# Patient Record
Sex: FEMALE | Race: WHITE | Employment: OTHER | URBAN - METROPOLITAN AREA
[De-identification: names, ages, dates, MRNs, and addresses within clinical notes are randomized per-mention and may not be internally consistent; named-entity substitution may affect disease eponyms.]

---

## 2017-01-07 ENCOUNTER — OFFICE VISIT (OUTPATIENT)
Dept: FAMILY MEDICINE CLINIC | Age: 67
End: 2017-01-07

## 2017-01-07 VITALS
HEIGHT: 68 IN | WEIGHT: 156 LBS | SYSTOLIC BLOOD PRESSURE: 114 MMHG | HEART RATE: 72 BPM | DIASTOLIC BLOOD PRESSURE: 72 MMHG | BODY MASS INDEX: 23.64 KG/M2 | RESPIRATION RATE: 16 BRPM | TEMPERATURE: 98.3 F

## 2017-01-07 DIAGNOSIS — G44.219 EPISODIC TENSION-TYPE HEADACHE, NOT INTRACTABLE: Primary | ICD-10-CM

## 2017-01-07 PROCEDURE — 99214 OFFICE O/P EST MOD 30 MIN: CPT | Performed by: FAMILY MEDICINE

## 2017-01-07 RX ORDER — CYCLOBENZAPRINE HCL 10 MG
10 TABLET ORAL NIGHTLY PRN
Qty: 10 TABLET | Refills: 3 | Status: SHIPPED | OUTPATIENT
Start: 2017-01-07 | End: 2018-01-07

## 2017-01-07 ASSESSMENT — PATIENT HEALTH QUESTIONNAIRE - PHQ9
SUM OF ALL RESPONSES TO PHQ9 QUESTIONS 1 & 2: 0
SUM OF ALL RESPONSES TO PHQ QUESTIONS 1-9: 0
2. FEELING DOWN, DEPRESSED OR HOPELESS: 0
1. LITTLE INTEREST OR PLEASURE IN DOING THINGS: 0

## 2017-01-09 PROBLEM — H40.033 NARROW ANGLE GLAUCOMA SUSPECT OF BOTH EYES: Status: ACTIVE | Noted: 2017-01-09

## 2017-03-10 ENCOUNTER — EMPLOYEE WELLNESS (OUTPATIENT)
Dept: OTHER | Age: 67
End: 2017-03-10

## 2017-08-31 ENCOUNTER — OFFICE VISIT (OUTPATIENT)
Dept: DERMATOLOGY | Age: 67
End: 2017-08-31

## 2017-08-31 DIAGNOSIS — L82.0 INFLAMED SEBORRHEIC KERATOSIS: ICD-10-CM

## 2017-08-31 DIAGNOSIS — Z85.828 HISTORY OF BASAL CELL CARCINOMA: ICD-10-CM

## 2017-08-31 DIAGNOSIS — Z80.8 FAMILY HISTORY OF MELANOMA: ICD-10-CM

## 2017-08-31 DIAGNOSIS — L82.1 SK (SEBORRHEIC KERATOSIS): Primary | ICD-10-CM

## 2017-08-31 PROCEDURE — 11306 SHAVE SKIN LESION 0.6-1.0 CM: CPT | Performed by: DERMATOLOGY

## 2017-08-31 PROCEDURE — 99213 OFFICE O/P EST LOW 20 MIN: CPT | Performed by: DERMATOLOGY

## 2017-09-05 ENCOUNTER — TELEPHONE (OUTPATIENT)
Dept: DERMATOLOGY | Age: 67
End: 2017-09-05

## 2017-10-09 ENCOUNTER — NURSE ONLY (OUTPATIENT)
Dept: FAMILY MEDICINE CLINIC | Age: 67
End: 2017-10-09

## 2017-10-09 DIAGNOSIS — Z23 NEEDS FLU SHOT: Primary | ICD-10-CM

## 2017-10-09 PROCEDURE — 90471 IMMUNIZATION ADMIN: CPT | Performed by: FAMILY MEDICINE

## 2017-10-09 PROCEDURE — 90630 INFLUENZA, QUADV, 18-64 YRS, ID, PF, MICRO INJ, 0.1ML (FLUZONE QUADV, PF): CPT | Performed by: FAMILY MEDICINE

## 2017-10-09 NOTE — PROGRESS NOTES
Vaccine Information Sheet, \"Influenza - Inactivated\"  given to Ge Jones, or parent/legal guardian of  Ge Jones and verbalized understanding. Patient responses:    Have you ever had a reaction to a flu vaccine? No  Are you able to eat eggs without adverse effects? No  Do you have any current illness? No  Have you ever had Guillian Delano Syndrome? No    Flu vaccine given per order. Please see immunization tab.

## 2017-10-12 ENCOUNTER — HOSPITAL ENCOUNTER (OUTPATIENT)
Dept: WOMENS IMAGING | Age: 67
Discharge: HOME | End: 2017-10-12
Attending: OBSTETRICS & GYNECOLOGY | Admitting: OBSTETRICS & GYNECOLOGY

## 2017-10-12 DIAGNOSIS — Z12.31 VISIT FOR SCREENING MAMMOGRAM: ICD-10-CM

## 2017-11-22 ENCOUNTER — HOSPITAL ENCOUNTER (OUTPATIENT)
Dept: GENERAL RADIOLOGY | Age: 67
Discharge: HOME | End: 2017-11-22
Attending: FAMILY MEDICINE | Admitting: FAMILY MEDICINE

## 2017-11-22 DIAGNOSIS — Z13.820 ENCOUNTER FOR SCREENING FOR OSTEOPOROSIS: ICD-10-CM

## 2017-11-22 DIAGNOSIS — Z13.820 SCREENING FOR OSTEOPOROSIS: ICD-10-CM

## 2018-03-20 VITALS — BODY MASS INDEX: 23.11 KG/M2 | WEIGHT: 152 LBS

## 2018-04-23 ENCOUNTER — OFFICE VISIT (OUTPATIENT)
Dept: ORTHOPEDIC SURGERY | Age: 68
End: 2018-04-23

## 2018-04-23 VITALS
HEART RATE: 62 BPM | BODY MASS INDEX: 24.17 KG/M2 | WEIGHT: 154 LBS | SYSTOLIC BLOOD PRESSURE: 99 MMHG | HEIGHT: 67 IN | DIASTOLIC BLOOD PRESSURE: 66 MMHG | RESPIRATION RATE: 16 BRPM

## 2018-04-23 DIAGNOSIS — G89.29 CHRONIC PAIN OF BOTH KNEES: ICD-10-CM

## 2018-04-23 DIAGNOSIS — M25.562 CHRONIC PAIN OF BOTH KNEES: ICD-10-CM

## 2018-04-23 DIAGNOSIS — M25.561 CHRONIC PAIN OF BOTH KNEES: ICD-10-CM

## 2018-04-23 DIAGNOSIS — M17.0 PRIMARY OSTEOARTHRITIS OF BOTH KNEES: Primary | ICD-10-CM

## 2018-04-23 PROCEDURE — G8427 DOCREV CUR MEDS BY ELIG CLIN: HCPCS | Performed by: ORTHOPAEDIC SURGERY

## 2018-04-23 PROCEDURE — 3017F COLORECTAL CA SCREEN DOC REV: CPT | Performed by: ORTHOPAEDIC SURGERY

## 2018-04-23 PROCEDURE — 1036F TOBACCO NON-USER: CPT | Performed by: ORTHOPAEDIC SURGERY

## 2018-04-23 PROCEDURE — 1090F PRES/ABSN URINE INCON ASSESS: CPT | Performed by: ORTHOPAEDIC SURGERY

## 2018-04-23 PROCEDURE — 20610 DRAIN/INJ JOINT/BURSA W/O US: CPT | Performed by: ORTHOPAEDIC SURGERY

## 2018-04-23 PROCEDURE — 1123F ACP DISCUSS/DSCN MKR DOCD: CPT | Performed by: ORTHOPAEDIC SURGERY

## 2018-04-23 PROCEDURE — G8399 PT W/DXA RESULTS DOCUMENT: HCPCS | Performed by: ORTHOPAEDIC SURGERY

## 2018-04-23 PROCEDURE — 4040F PNEUMOC VAC/ADMIN/RCVD: CPT | Performed by: ORTHOPAEDIC SURGERY

## 2018-04-23 PROCEDURE — 99203 OFFICE O/P NEW LOW 30 MIN: CPT | Performed by: ORTHOPAEDIC SURGERY

## 2018-04-23 PROCEDURE — G8420 CALC BMI NORM PARAMETERS: HCPCS | Performed by: ORTHOPAEDIC SURGERY

## 2018-09-04 ENCOUNTER — OFFICE VISIT (OUTPATIENT)
Dept: DERMATOLOGY | Age: 68
End: 2018-09-04

## 2018-09-04 DIAGNOSIS — L57.0 AK (ACTINIC KERATOSIS): ICD-10-CM

## 2018-09-04 DIAGNOSIS — D48.5 NEOPLASM OF UNCERTAIN BEHAVIOR OF SKIN: Primary | ICD-10-CM

## 2018-09-04 DIAGNOSIS — Z85.828 HISTORY OF BASAL CELL CARCINOMA: ICD-10-CM

## 2018-09-04 DIAGNOSIS — L82.1 SK (SEBORRHEIC KERATOSIS): ICD-10-CM

## 2018-09-04 DIAGNOSIS — Z80.8 FAMILY HISTORY OF MELANOMA: ICD-10-CM

## 2018-09-04 PROCEDURE — 11100 PR BIOPSY OF SKIN LESION: CPT | Performed by: DERMATOLOGY

## 2018-09-04 PROCEDURE — 1101F PT FALLS ASSESS-DOCD LE1/YR: CPT | Performed by: DERMATOLOGY

## 2018-09-04 PROCEDURE — G8399 PT W/DXA RESULTS DOCUMENT: HCPCS | Performed by: DERMATOLOGY

## 2018-09-04 PROCEDURE — G8427 DOCREV CUR MEDS BY ELIG CLIN: HCPCS | Performed by: DERMATOLOGY

## 2018-09-04 PROCEDURE — 99213 OFFICE O/P EST LOW 20 MIN: CPT | Performed by: DERMATOLOGY

## 2018-09-04 PROCEDURE — 1123F ACP DISCUSS/DSCN MKR DOCD: CPT | Performed by: DERMATOLOGY

## 2018-09-04 PROCEDURE — 17000 DESTRUCT PREMALG LESION: CPT | Performed by: DERMATOLOGY

## 2018-09-04 PROCEDURE — 3017F COLORECTAL CA SCREEN DOC REV: CPT | Performed by: DERMATOLOGY

## 2018-09-04 PROCEDURE — G8420 CALC BMI NORM PARAMETERS: HCPCS | Performed by: DERMATOLOGY

## 2018-09-04 PROCEDURE — 1036F TOBACCO NON-USER: CPT | Performed by: DERMATOLOGY

## 2018-09-04 PROCEDURE — 4040F PNEUMOC VAC/ADMIN/RCVD: CPT | Performed by: DERMATOLOGY

## 2018-09-04 PROCEDURE — 1090F PRES/ABSN URINE INCON ASSESS: CPT | Performed by: DERMATOLOGY

## 2018-09-04 NOTE — PROGRESS NOTES
[facility]at[facility]  Vannessa Pritchard MD  [phone]      Elijio Gowers  1950    74 y.o. female     Date of Visit: 9/4/2018    Chief Complaint: skin lesions    History of Present Illness:    1. She complains of an intermittently scabbed lesion on the side of the nose. 2.  She has numerous asymptomatic growths on the scalp and trunk. 3.  She has a hx of BCCs - denies any signs of recurrence. BCC of the nasal tip - treated with Mohs by [place], repair by [employer] in 2010. BCC on the forehead - treated with EDC in 1999.     Dysplastic nevus on the right upper back 2007.     4. She has a family history of melanomadad had melanoma on the back.       Review of Systems:  Skin: No new or changing moles. Past Medical History, Family History, Surgical History, Medications and Allergies reviewed. Past Medical History:   Diagnosis Date    Colon cancer ([address]     Narrow angle glaucoma suspect of both eyes 1/9/2017    Skin cancer     Thyroid disease      Past Surgical History:   Procedure Laterality Date    COLECTOMY  2011    cancerous polyp    COLONOSCOPY  09/16/2015    SKIN CANCER EXCISION      THYROID SURGERY  6/2011    BX        Allergies   Allergen Reactions    Adhesive Tape      bandaids    Erythromycin     Neosporin [Neomycin-Polymyxin-Gramicidin]      Any medication that ends in \"sporins\" - high sensitivity per PT      Outpatient Prescriptions Marked as Taking for the 9/4/18 encounter (Office Visit) with Jared Brewer MD   Medication Sig Dispense Refill    Multiple Vitamin (MULTIVITAMIN PO) Take  by mouth daily.  FIBER PO Take  by mouth daily.  Calcium Carbonate-Vit D-Min (CALCIUM 1200 PO) Take  by mouth daily. Social History:  Occupation:  Retired.        Physical Examination       The following were examined and determined to be normal: Psych/Neuro, Scalp/hair, Conjunctivae/eyelids, Gums/teeth/lips, Neck, Breast/axilla/chest, Abdomen, Back, RUE, LUE, RLE, LLE and Nails/digits. The following were examined and determined to be abnormal: Head/face. Well-appearing. 1.  Right nasal ala with a 6 mm telangiectatic pearly papule. 2.  Trunk axilla and scalp with multiple stuck on appearing waxy and verrucous brown papules and plaques. 3.  Clear. 4.  Upper lip at vermilion border - scaly pink macule. Assessment and Plan     1. Neoplasm of uncertain behavior of skin, right nasal ala - r/o BCC    Discussed possible diagnosis; patient agreeable to biopsy (verbal consent obtained). The area(s) to be biopsied were marked with a surgical pen. Alcohol was used to cleanse the site. Local anesthesia was acheived with 1% lidocaine with epinephrine. Shave biopsy was performed using a razor blade. Hemostasis was achieved with aluminum chloride. The wound(s) were dressed with petrolatum and covered with a bandage. Wound care instructions were reviewed. 1 Specimen (s) sent to pathology. The specimen bottles were appropriately labeled. We also reviewed the risks of bleeding, scar, and infection. 2. SK (seborrheic keratosis) - multiple    Reassurance. 3. History of basal cell carcinomas - clear    Sun protective behaviors and self skin examinations were encouraged. Call for any new or concerning lesions. 4.  AK of the upper lip -     2 cycles of liquid nitrogen applied to 1 AK on the upper lip. Patient was educated regarding the potential risks of blister formation, discomfort, hypopigmentation, and scar. Wound care was discussed. 5. Family history of melanoma    Sun protective behaviors and self skin examinations were encouraged. Call for any new or concerning lesions. Return in about 6 months (around 3/4/2019).

## 2018-09-06 DIAGNOSIS — C44.311 BASAL CELL CARCINOMA OF NOSE: Primary | ICD-10-CM

## 2018-09-06 LAB — DERMATOLOGY PATHOLOGY REPORT: ABNORMAL

## 2018-10-03 ENCOUNTER — PROCEDURE VISIT (OUTPATIENT)
Dept: SURGERY | Age: 68
End: 2018-10-03
Payer: MEDICARE

## 2018-10-03 VITALS
DIASTOLIC BLOOD PRESSURE: 63 MMHG | TEMPERATURE: 98.3 F | SYSTOLIC BLOOD PRESSURE: 123 MMHG | HEART RATE: 76 BPM | OXYGEN SATURATION: 98 % | WEIGHT: 153 LBS | BODY MASS INDEX: 24.01 KG/M2

## 2018-10-03 DIAGNOSIS — C44.311 BASAL CELL CARCINOMA OF RIGHT ALA NASI: Primary | ICD-10-CM

## 2018-10-03 PROBLEM — Z48.02 VISIT FOR SUTURE REMOVAL: Status: ACTIVE | Noted: 2018-10-03

## 2018-10-03 PROCEDURE — 17311 MOHS 1 STAGE H/N/HF/G: CPT | Performed by: DERMATOLOGY

## 2018-10-03 PROCEDURE — 14060 TIS TRNFR E/N/E/L 10 SQ CM/<: CPT | Performed by: DERMATOLOGY

## 2018-10-03 PROCEDURE — 17312 MOHS ADDL STAGE: CPT | Performed by: DERMATOLOGY

## 2018-10-03 NOTE — PROGRESS NOTES
PRE-PROCEDURE SCREENING    Pacemaker/ICD: No  Difficulty with numbing in the past: No  Local Anesthesia Reaction/passing out: Yes, can get light headed  Latex or adhesive allergy:  Yes, sensitive to bandaids  Bleeding/Clotting Disorders: No  Anticoagulant Therapy: No  Joint prosthesis: No  Artificial Heart Valve: No  Stroke or Seizures: No  Organ Transplant or Lymphoma: No  Immunosuppression: No  Respiratory Problems: No

## 2018-10-04 ENCOUNTER — TELEPHONE (OUTPATIENT)
Dept: SURGERY | Age: 68
End: 2018-10-04

## 2018-10-04 NOTE — TELEPHONE ENCOUNTER
The patient was in the office on 10/3/18 for Mohs surgery located on the Right nasal ala with Rhombic transposition flap repair. The patient tolerated the procedure well and left the office in good condition. A post-operative telephone call was placed at 12:11 in order to check on the patient's recovery process. The patient reported doing well and had no complaints. All of the patient's questions were answered.

## 2018-10-10 ENCOUNTER — OFFICE VISIT (OUTPATIENT)
Dept: SURGERY | Age: 68
End: 2018-10-10

## 2018-10-10 DIAGNOSIS — Z48.02 VISIT FOR SUTURE REMOVAL: Primary | ICD-10-CM

## 2018-10-10 PROCEDURE — 99024 POSTOP FOLLOW-UP VISIT: CPT | Performed by: DERMATOLOGY

## 2018-10-10 NOTE — PROGRESS NOTES
S:  The patient is here for suture removal s/p Mohs surgery on the right nasal ala and Rhombic Transposition Flap repair, one week ago. The site appears well-healed without signs of infection (redness, pain or discharge) flap looks great. The sutures were removed. Vaseline was applied. Wound care and activity instructions given. The patient was scheduled for follow-up in 2 months for scar/wound check. The patient was scheduled for f/u with General Dermatology per their instructions.

## 2018-10-29 ENCOUNTER — HOSPITAL ENCOUNTER (OUTPATIENT)
Dept: WOMENS IMAGING | Age: 68
Discharge: HOME | End: 2018-10-29
Payer: MEDICARE

## 2018-10-29 DIAGNOSIS — Z12.31 VISIT FOR SCREENING MAMMOGRAM: ICD-10-CM

## 2018-10-29 PROCEDURE — 77063 BREAST TOMOSYNTHESIS BI: CPT

## 2018-11-12 ENCOUNTER — HOSPITAL ENCOUNTER (OUTPATIENT)
Dept: ULTRASOUND IMAGING | Age: 68
Discharge: HOME | End: 2018-11-12
Payer: MEDICARE

## 2018-11-12 ENCOUNTER — HOSPITAL ENCOUNTER (OUTPATIENT)
Dept: WOMENS IMAGING | Age: 68
Discharge: HOME | End: 2018-11-12
Payer: MEDICARE

## 2018-11-12 DIAGNOSIS — R92.8 ABNORMAL MAMMOGRAM: ICD-10-CM

## 2018-11-12 PROCEDURE — G0279 TOMOSYNTHESIS, MAMMO: HCPCS

## 2018-11-12 PROCEDURE — 76642 ULTRASOUND BREAST LIMITED: CPT

## 2018-12-10 ENCOUNTER — OFFICE VISIT (OUTPATIENT)
Dept: SURGERY | Age: 68
End: 2018-12-10

## 2018-12-10 DIAGNOSIS — L90.5 SCAR: Primary | ICD-10-CM

## 2018-12-10 PROCEDURE — 99024 POSTOP FOLLOW-UP VISIT: CPT | Performed by: DERMATOLOGY

## 2018-12-20 ENCOUNTER — TELEPHONE (OUTPATIENT)
Dept: FAMILY MEDICINE CLINIC | Age: 68
End: 2018-12-20

## 2018-12-26 ENCOUNTER — OFFICE VISIT (OUTPATIENT)
Dept: FAMILY MEDICINE CLINIC | Age: 68
End: 2018-12-26
Payer: MEDICARE

## 2018-12-26 VITALS
BODY MASS INDEX: 24.17 KG/M2 | RESPIRATION RATE: 16 BRPM | TEMPERATURE: 98.2 F | WEIGHT: 154 LBS | HEIGHT: 67 IN | HEART RATE: 64 BPM | SYSTOLIC BLOOD PRESSURE: 112 MMHG | DIASTOLIC BLOOD PRESSURE: 62 MMHG

## 2018-12-26 DIAGNOSIS — Z23 NEED FOR PNEUMOCOCCAL VACCINATION: ICD-10-CM

## 2018-12-26 DIAGNOSIS — R93.89 ENDOMETRIAL THICKENING ON ULTRASOUND: ICD-10-CM

## 2018-12-26 DIAGNOSIS — Z85.038 HISTORY OF COLON CANCER: ICD-10-CM

## 2018-12-26 DIAGNOSIS — Z01.810 PRE-OPERATIVE CARDIOVASCULAR EXAMINATION: Primary | ICD-10-CM

## 2018-12-26 DIAGNOSIS — Z85.828 HISTORY OF BASAL CELL CARCINOMA: ICD-10-CM

## 2018-12-26 PROBLEM — Z48.02 VISIT FOR SUTURE REMOVAL: Status: RESOLVED | Noted: 2018-10-03 | Resolved: 2018-12-26

## 2018-12-26 PROCEDURE — G8420 CALC BMI NORM PARAMETERS: HCPCS | Performed by: FAMILY MEDICINE

## 2018-12-26 PROCEDURE — G8399 PT W/DXA RESULTS DOCUMENT: HCPCS | Performed by: FAMILY MEDICINE

## 2018-12-26 PROCEDURE — G8427 DOCREV CUR MEDS BY ELIG CLIN: HCPCS | Performed by: FAMILY MEDICINE

## 2018-12-26 PROCEDURE — 93000 ELECTROCARDIOGRAM COMPLETE: CPT | Performed by: FAMILY MEDICINE

## 2018-12-26 PROCEDURE — G0009 ADMIN PNEUMOCOCCAL VACCINE: HCPCS | Performed by: FAMILY MEDICINE

## 2018-12-26 PROCEDURE — G8482 FLU IMMUNIZE ORDER/ADMIN: HCPCS | Performed by: FAMILY MEDICINE

## 2018-12-26 PROCEDURE — 1036F TOBACCO NON-USER: CPT | Performed by: FAMILY MEDICINE

## 2018-12-26 PROCEDURE — 90670 PCV13 VACCINE IM: CPT | Performed by: FAMILY MEDICINE

## 2018-12-26 PROCEDURE — 1090F PRES/ABSN URINE INCON ASSESS: CPT | Performed by: FAMILY MEDICINE

## 2018-12-26 PROCEDURE — 1123F ACP DISCUSS/DSCN MKR DOCD: CPT | Performed by: FAMILY MEDICINE

## 2018-12-26 PROCEDURE — 99214 OFFICE O/P EST MOD 30 MIN: CPT | Performed by: FAMILY MEDICINE

## 2018-12-26 PROCEDURE — 4040F PNEUMOC VAC/ADMIN/RCVD: CPT | Performed by: FAMILY MEDICINE

## 2018-12-26 PROCEDURE — 3017F COLORECTAL CA SCREEN DOC REV: CPT | Performed by: FAMILY MEDICINE

## 2018-12-26 PROCEDURE — 1101F PT FALLS ASSESS-DOCD LE1/YR: CPT | Performed by: FAMILY MEDICINE

## 2018-12-26 ASSESSMENT — PATIENT HEALTH QUESTIONNAIRE - PHQ9
SUM OF ALL RESPONSES TO PHQ QUESTIONS 1-9: 0
SUM OF ALL RESPONSES TO PHQ QUESTIONS 1-9: 0
2. FEELING DOWN, DEPRESSED OR HOPELESS: 0
1. LITTLE INTEREST OR PLEASURE IN DOING THINGS: 0
SUM OF ALL RESPONSES TO PHQ9 QUESTIONS 1 & 2: 0

## 2018-12-26 NOTE — PROGRESS NOTES
in skin spots? No    Patient Active Problem List   Diagnosis    Multinodular goiter    Osteopenia    History of colon cancer- 2002, resected    Episodic tension-type headache, not intractable    Narrow angle glaucoma suspect of both eyes    Primary osteoarthritis of both knees    Scar    History of basal cell carcinoma     Past Medical History:   Diagnosis Date    Colon cancer ([address]     Narrow angle glaucoma suspect of both eyes 1/9/2017    Skin cancer     Thyroid disease      Past Surgical History:   Procedure Laterality Date    BREAST BIOPSY Left 2018    benign    COLECTOMY  2011    cancerous polyp    COLONOSCOPY  09/16/2015    MOHS SURGERY  10/03/2018    R ala BCC    SKIN CANCER EXCISION      THYROID SURGERY  6/2011    BX      Family History   Problem Relation Age of Onset    Cancer Father         ethel. melanoma     Heart Disease Maternal Grandfather     Osteoporosis Mother     Diabetes Neg Hx     High Cholesterol Neg Hx     Hypertension Neg Hx     Stroke Neg Hx     Thyroid Disease Neg Hx     Seizures Neg Hx      Social History     Social History    Marital status:      Spouse name: N/A    Number of children: N/A    Years of education: N/A     Social History Main Topics    Smoking status: Never Smoker    Smokeless tobacco: Never Used      Comment: advised not to start    Alcohol use None    Drug use: No    Sexual activity: Yes     Other Topics Concern    None     Social History Narrative    Exercise: walking daily. Lives with spouse. Seatbelt use: Always. Living will:  no,   additional information provided. Allergies   Allergen Reactions    Adhesive Tape      bandaids    Erythromycin     Neosporin [Neomycin-Polymyxin-Gramicidin]      Any medication that ends in \"sporins\" - high sensitivity per PT      Prior to Visit Medications    Medication Sig Taking? Authorizing Provider   Multiple Vitamin (MULTIVITAMIN PO) Take  by mouth daily.    Yes Historical

## 2019-01-03 ENCOUNTER — ANESTHESIA EVENT (OUTPATIENT)
Dept: OPERATING ROOM | Age: 69
End: 2019-01-03
Payer: MEDICARE

## 2019-01-04 RX ORDER — IBUPROFEN 200 MG
200 TABLET ORAL EVERY 6 HOURS PRN
COMMUNITY

## 2019-01-04 RX ORDER — MAGNESIUM 30 MG
250 TABLET ORAL DAILY
COMMUNITY

## 2019-01-08 ENCOUNTER — ANESTHESIA (OUTPATIENT)
Dept: OPERATING ROOM | Age: 69
End: 2019-01-08
Payer: MEDICARE

## 2019-01-08 ENCOUNTER — HOSPITAL ENCOUNTER (OUTPATIENT)
Age: 69
Setting detail: OUTPATIENT SURGERY
Discharge: HOME | End: 2019-01-08
Attending: OBSTETRICS & GYNECOLOGY | Admitting: OBSTETRICS & GYNECOLOGY
Payer: MEDICARE

## 2019-01-08 VITALS
HEIGHT: 67 IN | TEMPERATURE: 97 F | DIASTOLIC BLOOD PRESSURE: 61 MMHG | SYSTOLIC BLOOD PRESSURE: 106 MMHG | OXYGEN SATURATION: 98 % | RESPIRATION RATE: 14 BRPM | BODY MASS INDEX: 24.78 KG/M2 | WEIGHT: 157.85 LBS | HEART RATE: 52 BPM

## 2019-01-08 VITALS
SYSTOLIC BLOOD PRESSURE: 90 MMHG | RESPIRATION RATE: 6 BRPM | OXYGEN SATURATION: 99 % | DIASTOLIC BLOOD PRESSURE: 51 MMHG

## 2019-01-08 DIAGNOSIS — R93.89 THICKENED ENDOMETRIUM: ICD-10-CM

## 2019-01-08 DIAGNOSIS — R87.619 ATYPICAL GLANDULAR CELLS OF UNDETERMINED SIGNIFICANCE (AGUS) ON CERVICAL PAP SMEAR: ICD-10-CM

## 2019-01-08 LAB
ABO/RH: NORMAL
ANTIBODY SCREEN: NORMAL
HCT VFR BLD CALC: 43.9 % (ref 36–48)
HEMOGLOBIN: 14.5 G/DL (ref 12–16)
MCH RBC QN AUTO: 31.5 PG (ref 26–34)
MCHC RBC AUTO-ENTMCNC: 32.9 G/DL (ref 31–36)
MCV RBC AUTO: 95.6 FL (ref 80–100)
PDW BLD-RTO: 13 % (ref 12.4–15.4)
PLATELET # BLD: 209 K/UL (ref 135–450)
PMV BLD AUTO: 8.3 FL (ref 5–10.5)
RBC # BLD: 4.59 M/UL (ref 4–5.2)
WBC # BLD: 6.1 K/UL (ref 4–11)

## 2019-01-08 PROCEDURE — 86900 BLOOD TYPING SEROLOGIC ABO: CPT

## 2019-01-08 PROCEDURE — 7100000011 HC PHASE II RECOVERY - ADDTL 15 MIN: Performed by: OBSTETRICS & GYNECOLOGY

## 2019-01-08 PROCEDURE — 2580000003 HC RX 258: Performed by: ANESTHESIOLOGY

## 2019-01-08 PROCEDURE — 2500000003 HC RX 250 WO HCPCS: Performed by: NURSE ANESTHETIST, CERTIFIED REGISTERED

## 2019-01-08 PROCEDURE — 88305 TISSUE EXAM BY PATHOLOGIST: CPT

## 2019-01-08 PROCEDURE — 86901 BLOOD TYPING SEROLOGIC RH(D): CPT

## 2019-01-08 PROCEDURE — 7100000010 HC PHASE II RECOVERY - FIRST 15 MIN: Performed by: OBSTETRICS & GYNECOLOGY

## 2019-01-08 PROCEDURE — 3600000014 HC SURGERY LEVEL 4 ADDTL 15MIN: Performed by: OBSTETRICS & GYNECOLOGY

## 2019-01-08 PROCEDURE — 7100000001 HC PACU RECOVERY - ADDTL 15 MIN: Performed by: OBSTETRICS & GYNECOLOGY

## 2019-01-08 PROCEDURE — 7100000000 HC PACU RECOVERY - FIRST 15 MIN: Performed by: OBSTETRICS & GYNECOLOGY

## 2019-01-08 PROCEDURE — 2709999900 HC NON-CHARGEABLE SUPPLY: Performed by: OBSTETRICS & GYNECOLOGY

## 2019-01-08 PROCEDURE — 6360000002 HC RX W HCPCS: Performed by: NURSE ANESTHETIST, CERTIFIED REGISTERED

## 2019-01-08 PROCEDURE — 86850 RBC ANTIBODY SCREEN: CPT

## 2019-01-08 PROCEDURE — 3700000000 HC ANESTHESIA ATTENDED CARE: Performed by: OBSTETRICS & GYNECOLOGY

## 2019-01-08 PROCEDURE — 3700000001 HC ADD 15 MINUTES (ANESTHESIA): Performed by: OBSTETRICS & GYNECOLOGY

## 2019-01-08 PROCEDURE — 3600000004 HC SURGERY LEVEL 4 BASE: Performed by: OBSTETRICS & GYNECOLOGY

## 2019-01-08 PROCEDURE — 85027 COMPLETE CBC AUTOMATED: CPT

## 2019-01-08 PROCEDURE — 6360000002 HC RX W HCPCS: Performed by: ANESTHESIOLOGY

## 2019-01-08 RX ORDER — MIDAZOLAM HYDROCHLORIDE 1 MG/ML
INJECTION INTRAMUSCULAR; INTRAVENOUS PRN
Status: DISCONTINUED | OUTPATIENT
Start: 2019-01-08 | End: 2019-01-08 | Stop reason: SDUPTHER

## 2019-01-08 RX ORDER — SODIUM CHLORIDE 9 MG/ML
INJECTION, SOLUTION INTRAVENOUS CONTINUOUS
Status: DISCONTINUED | OUTPATIENT
Start: 2019-01-08 | End: 2019-01-08 | Stop reason: HOSPADM

## 2019-01-08 RX ORDER — FENTANYL CITRATE 50 UG/ML
50 INJECTION, SOLUTION INTRAMUSCULAR; INTRAVENOUS EVERY 5 MIN PRN
Status: DISCONTINUED | OUTPATIENT
Start: 2019-01-08 | End: 2019-01-08 | Stop reason: HOSPADM

## 2019-01-08 RX ORDER — ONDANSETRON 2 MG/ML
4 INJECTION INTRAMUSCULAR; INTRAVENOUS
Status: COMPLETED | OUTPATIENT
Start: 2019-01-08 | End: 2019-01-08

## 2019-01-08 RX ORDER — SODIUM CHLORIDE 0.9 % (FLUSH) 0.9 %
10 SYRINGE (ML) INJECTION PRN
Status: DISCONTINUED | OUTPATIENT
Start: 2019-01-08 | End: 2019-01-08 | Stop reason: HOSPADM

## 2019-01-08 RX ORDER — MEPERIDINE HYDROCHLORIDE 25 MG/ML
12.5 INJECTION INTRAMUSCULAR; INTRAVENOUS; SUBCUTANEOUS EVERY 5 MIN PRN
Status: DISCONTINUED | OUTPATIENT
Start: 2019-01-08 | End: 2019-01-08 | Stop reason: HOSPADM

## 2019-01-08 RX ORDER — OXYCODONE HYDROCHLORIDE 5 MG/1
5 TABLET ORAL PRN
Status: DISCONTINUED | OUTPATIENT
Start: 2019-01-08 | End: 2019-01-08 | Stop reason: HOSPADM

## 2019-01-08 RX ORDER — MORPHINE SULFATE 2 MG/ML
1 INJECTION, SOLUTION INTRAMUSCULAR; INTRAVENOUS EVERY 5 MIN PRN
Status: DISCONTINUED | OUTPATIENT
Start: 2019-01-08 | End: 2019-01-08 | Stop reason: HOSPADM

## 2019-01-08 RX ORDER — FENTANYL CITRATE 50 UG/ML
25 INJECTION, SOLUTION INTRAMUSCULAR; INTRAVENOUS EVERY 5 MIN PRN
Status: DISCONTINUED | OUTPATIENT
Start: 2019-01-08 | End: 2019-01-08 | Stop reason: HOSPADM

## 2019-01-08 RX ORDER — SODIUM CHLORIDE 0.9 % (FLUSH) 0.9 %
10 SYRINGE (ML) INJECTION EVERY 12 HOURS SCHEDULED
Status: DISCONTINUED | OUTPATIENT
Start: 2019-01-08 | End: 2019-01-08 | Stop reason: HOSPADM

## 2019-01-08 RX ORDER — DEXAMETHASONE SODIUM PHOSPHATE 4 MG/ML
INJECTION, SOLUTION INTRA-ARTICULAR; INTRALESIONAL; INTRAMUSCULAR; INTRAVENOUS; SOFT TISSUE PRN
Status: DISCONTINUED | OUTPATIENT
Start: 2019-01-08 | End: 2019-01-08 | Stop reason: SDUPTHER

## 2019-01-08 RX ORDER — LIDOCAINE HYDROCHLORIDE 20 MG/ML
INJECTION, SOLUTION EPIDURAL; INFILTRATION; INTRACAUDAL; PERINEURAL PRN
Status: DISCONTINUED | OUTPATIENT
Start: 2019-01-08 | End: 2019-01-08 | Stop reason: SDUPTHER

## 2019-01-08 RX ORDER — PROPOFOL 10 MG/ML
INJECTION, EMULSION INTRAVENOUS PRN
Status: DISCONTINUED | OUTPATIENT
Start: 2019-01-08 | End: 2019-01-08 | Stop reason: SDUPTHER

## 2019-01-08 RX ORDER — FENTANYL CITRATE 50 UG/ML
INJECTION, SOLUTION INTRAMUSCULAR; INTRAVENOUS PRN
Status: DISCONTINUED | OUTPATIENT
Start: 2019-01-08 | End: 2019-01-08 | Stop reason: SDUPTHER

## 2019-01-08 RX ORDER — OXYCODONE HYDROCHLORIDE 5 MG/1
10 TABLET ORAL PRN
Status: DISCONTINUED | OUTPATIENT
Start: 2019-01-08 | End: 2019-01-08 | Stop reason: HOSPADM

## 2019-01-08 RX ORDER — MORPHINE SULFATE 2 MG/ML
2 INJECTION, SOLUTION INTRAMUSCULAR; INTRAVENOUS EVERY 5 MIN PRN
Status: DISCONTINUED | OUTPATIENT
Start: 2019-01-08 | End: 2019-01-08 | Stop reason: HOSPADM

## 2019-01-08 RX ADMIN — LIDOCAINE HYDROCHLORIDE 40 MG: 20 INJECTION, SOLUTION EPIDURAL; INFILTRATION; INTRACAUDAL; PERINEURAL at 08:40

## 2019-01-08 RX ADMIN — FENTANYL CITRATE 100 MCG: 50 INJECTION INTRAMUSCULAR; INTRAVENOUS at 08:35

## 2019-01-08 RX ADMIN — DEXAMETHASONE SODIUM PHOSPHATE 8 MG: 4 INJECTION, SOLUTION INTRAMUSCULAR; INTRAVENOUS at 08:56

## 2019-01-08 RX ADMIN — ONDANSETRON 4 MG: 2 INJECTION INTRAMUSCULAR; INTRAVENOUS at 08:56

## 2019-01-08 RX ADMIN — SODIUM CHLORIDE: 9 INJECTION, SOLUTION INTRAVENOUS at 07:43

## 2019-01-08 RX ADMIN — MIDAZOLAM 2 MG: 1 INJECTION INTRAMUSCULAR; INTRAVENOUS at 08:35

## 2019-01-08 RX ADMIN — PROPOFOL 150 MG: 10 INJECTION, EMULSION INTRAVENOUS at 08:40

## 2019-01-08 ASSESSMENT — PULMONARY FUNCTION TESTS
PIF_VALUE: 9
PIF_VALUE: 13
PIF_VALUE: 1
PIF_VALUE: 3
PIF_VALUE: 17
PIF_VALUE: 2
PIF_VALUE: 13
PIF_VALUE: 14
PIF_VALUE: 3
PIF_VALUE: 17
PIF_VALUE: 1
PIF_VALUE: 2
PIF_VALUE: 3
PIF_VALUE: 0
PIF_VALUE: 1
PIF_VALUE: 15
PIF_VALUE: 14
PIF_VALUE: 11
PIF_VALUE: 2
PIF_VALUE: 3
PIF_VALUE: 14
PIF_VALUE: 9
PIF_VALUE: 3
PIF_VALUE: 15
PIF_VALUE: 1

## 2019-01-08 ASSESSMENT — PAIN SCALES - GENERAL
PAINLEVEL_OUTOF10: 0

## 2019-01-08 ASSESSMENT — PAIN - FUNCTIONAL ASSESSMENT: PAIN_FUNCTIONAL_ASSESSMENT: 0-10

## 2019-03-04 ENCOUNTER — OFFICE VISIT (OUTPATIENT)
Dept: DERMATOLOGY | Age: 69
End: 2019-03-04
Payer: MEDICARE

## 2019-03-04 DIAGNOSIS — L82.1 SEBORRHEIC KERATOSIS: Primary | ICD-10-CM

## 2019-03-04 DIAGNOSIS — Z85.828 HISTORY OF BASAL CELL CARCINOMA: ICD-10-CM

## 2019-03-04 DIAGNOSIS — Z80.8 FAMILY HISTORY OF MELANOMA: ICD-10-CM

## 2019-03-04 PROCEDURE — 3017F COLORECTAL CA SCREEN DOC REV: CPT | Performed by: DERMATOLOGY

## 2019-03-04 PROCEDURE — 99213 OFFICE O/P EST LOW 20 MIN: CPT | Performed by: DERMATOLOGY

## 2019-03-04 PROCEDURE — G8399 PT W/DXA RESULTS DOCUMENT: HCPCS | Performed by: DERMATOLOGY

## 2019-03-04 PROCEDURE — 4040F PNEUMOC VAC/ADMIN/RCVD: CPT | Performed by: DERMATOLOGY

## 2019-03-04 PROCEDURE — G8420 CALC BMI NORM PARAMETERS: HCPCS | Performed by: DERMATOLOGY

## 2019-03-04 PROCEDURE — G8427 DOCREV CUR MEDS BY ELIG CLIN: HCPCS | Performed by: DERMATOLOGY

## 2019-03-04 PROCEDURE — 1090F PRES/ABSN URINE INCON ASSESS: CPT | Performed by: DERMATOLOGY

## 2019-03-04 PROCEDURE — 1101F PT FALLS ASSESS-DOCD LE1/YR: CPT | Performed by: DERMATOLOGY

## 2019-03-04 PROCEDURE — G8482 FLU IMMUNIZE ORDER/ADMIN: HCPCS | Performed by: DERMATOLOGY

## 2019-03-04 PROCEDURE — 1123F ACP DISCUSS/DSCN MKR DOCD: CPT | Performed by: DERMATOLOGY

## 2019-03-04 PROCEDURE — 1036F TOBACCO NON-USER: CPT | Performed by: DERMATOLOGY

## 2019-03-25 ENCOUNTER — OFFICE VISIT (OUTPATIENT)
Dept: FAMILY MEDICINE CLINIC | Age: 69
End: 2019-03-25
Payer: MEDICARE

## 2019-03-25 VITALS
WEIGHT: 153 LBS | HEIGHT: 67 IN | TEMPERATURE: 97.8 F | DIASTOLIC BLOOD PRESSURE: 68 MMHG | BODY MASS INDEX: 24.01 KG/M2 | RESPIRATION RATE: 18 BRPM | SYSTOLIC BLOOD PRESSURE: 106 MMHG | HEART RATE: 62 BPM

## 2019-03-25 DIAGNOSIS — E80.6 HYPERBILIRUBINEMIA: ICD-10-CM

## 2019-03-25 DIAGNOSIS — M85.89 OSTEOPENIA OF MULTIPLE SITES: ICD-10-CM

## 2019-03-25 DIAGNOSIS — E04.2 MULTINODULAR GOITER: ICD-10-CM

## 2019-03-25 DIAGNOSIS — M17.0 PRIMARY OSTEOARTHRITIS OF BOTH KNEES: ICD-10-CM

## 2019-03-25 DIAGNOSIS — Z85.038 HISTORY OF COLON CANCER: ICD-10-CM

## 2019-03-25 DIAGNOSIS — Z00.00 INITIAL MEDICARE ANNUAL WELLNESS VISIT: ICD-10-CM

## 2019-03-25 DIAGNOSIS — Z11.59 NEED FOR HEPATITIS C SCREENING TEST: ICD-10-CM

## 2019-03-25 DIAGNOSIS — Z85.828 HISTORY OF BASAL CELL CARCINOMA: ICD-10-CM

## 2019-03-25 DIAGNOSIS — H40.033 NARROW ANGLE GLAUCOMA SUSPECT OF BOTH EYES: ICD-10-CM

## 2019-03-25 DIAGNOSIS — Z00.00 INITIAL MEDICARE ANNUAL WELLNESS VISIT: Primary | ICD-10-CM

## 2019-03-25 PROBLEM — L90.5 SCAR: Status: RESOLVED | Noted: 2018-12-10 | Resolved: 2019-03-25

## 2019-03-25 LAB
CHOLESTEROL, TOTAL: 157 MG/DL (ref 0–199)
HDLC SERPL-MCNC: 62 MG/DL (ref 40–60)
HEPATITIS C ANTIBODY INTERPRETATION: NORMAL
LDL CHOLESTEROL CALCULATED: 85 MG/DL
TRIGL SERPL-MCNC: 50 MG/DL (ref 0–150)
TSH SERPL DL<=0.05 MIU/L-ACNC: 0.64 UIU/ML (ref 0.27–4.2)
VLDLC SERPL CALC-MCNC: 10 MG/DL

## 2019-03-25 PROCEDURE — G0438 PPPS, INITIAL VISIT: HCPCS | Performed by: FAMILY MEDICINE

## 2019-03-25 PROCEDURE — G8482 FLU IMMUNIZE ORDER/ADMIN: HCPCS | Performed by: FAMILY MEDICINE

## 2019-03-25 PROCEDURE — 4040F PNEUMOC VAC/ADMIN/RCVD: CPT | Performed by: FAMILY MEDICINE

## 2019-03-25 ASSESSMENT — PATIENT HEALTH QUESTIONNAIRE - PHQ9
SUM OF ALL RESPONSES TO PHQ QUESTIONS 1-9: 0
SUM OF ALL RESPONSES TO PHQ QUESTIONS 1-9: 0
1. LITTLE INTEREST OR PLEASURE IN DOING THINGS: 0
2. FEELING DOWN, DEPRESSED OR HOPELESS: 0
SUM OF ALL RESPONSES TO PHQ9 QUESTIONS 1 & 2: 0

## 2019-03-26 ENCOUNTER — TELEPHONE (OUTPATIENT)
Dept: FAMILY MEDICINE CLINIC | Age: 69
End: 2019-03-26

## 2019-03-26 LAB
A/G RATIO: 1.8 (ref 1.1–2.2)
ALBUMIN SERPL-MCNC: 4.3 G/DL (ref 3.4–5)
ALP BLD-CCNC: 63 U/L (ref 40–129)
ALT SERPL-CCNC: 15 U/L (ref 10–40)
ANION GAP SERPL CALCULATED.3IONS-SCNC: 10 MMOL/L (ref 3–16)
AST SERPL-CCNC: 20 U/L (ref 15–37)
BILIRUB SERPL-MCNC: 1.8 MG/DL (ref 0–1)
BUN BLDV-MCNC: 12 MG/DL (ref 7–20)
CALCIUM SERPL-MCNC: 9.3 MG/DL (ref 8.3–10.6)
CHLORIDE BLD-SCNC: 105 MMOL/L (ref 99–110)
CO2: 27 MMOL/L (ref 21–32)
CREAT SERPL-MCNC: 0.7 MG/DL (ref 0.6–1.2)
GFR AFRICAN AMERICAN: >60
GFR NON-AFRICAN AMERICAN: >60
GLOBULIN: 2.4 G/DL
GLUCOSE BLD-MCNC: 85 MG/DL (ref 70–99)
POTASSIUM SERPL-SCNC: 4.5 MMOL/L (ref 3.5–5.1)
SODIUM BLD-SCNC: 142 MMOL/L (ref 136–145)
TOTAL PROTEIN: 6.7 G/DL (ref 6.4–8.2)

## 2019-04-10 ENCOUNTER — OFFICE VISIT (OUTPATIENT)
Dept: ORTHOPEDIC SURGERY | Age: 69
End: 2019-04-10
Payer: MEDICARE

## 2019-04-10 VITALS
BODY MASS INDEX: 24.01 KG/M2 | WEIGHT: 153 LBS | DIASTOLIC BLOOD PRESSURE: 62 MMHG | HEART RATE: 71 BPM | SYSTOLIC BLOOD PRESSURE: 99 MMHG | HEIGHT: 67 IN

## 2019-04-10 DIAGNOSIS — M17.0 PRIMARY OSTEOARTHRITIS OF BOTH KNEES: Primary | ICD-10-CM

## 2019-04-10 PROCEDURE — 99213 OFFICE O/P EST LOW 20 MIN: CPT | Performed by: ORTHOPAEDIC SURGERY

## 2019-04-10 PROCEDURE — 20610 DRAIN/INJ JOINT/BURSA W/O US: CPT | Performed by: ORTHOPAEDIC SURGERY

## 2019-04-10 RX ORDER — TRIAMCINOLONE ACETONIDE 40 MG/ML
40 INJECTION, SUSPENSION INTRA-ARTICULAR; INTRAMUSCULAR ONCE
Status: COMPLETED | OUTPATIENT
Start: 2019-04-10 | End: 2019-04-10

## 2019-04-10 RX ADMIN — TRIAMCINOLONE ACETONIDE 40 MG: 40 INJECTION, SUSPENSION INTRA-ARTICULAR; INTRAMUSCULAR at 13:50

## 2019-04-10 RX ADMIN — TRIAMCINOLONE ACETONIDE 40 MG: 40 INJECTION, SUSPENSION INTRA-ARTICULAR; INTRAMUSCULAR at 13:49

## 2019-04-10 NOTE — PROGRESS NOTES
CHIEF COMPLAINT: Bilateral knee pain. History:   Lizbeth Aguirre is a 79 y.o. White female here for bilateral knee follow up. Prior injections on 4/23/18 lasted almost 1 year. She is requesting repeat injections because she is going on a trip to [place]. She rates her pain 5/10. Physical Examination:     Vital signs:   BP 99/62   Pulse 71   Ht 5' 6.5\" (1.689 m)   Wt 153 lb (69.4 kg)   BMI 24.32 kg/m²      General:  alert, appears stated age, cooperative and no distress       Imaging:  Bilateral Knee X-Ray 4/23/18: Tibiofemoral joint space maintainted, minimal marginal osteophytes. Severe end-stage patellofemoral arthritis on right. Severe patellofemoral arthritis on left. Assessment:     Bilateral knee patellofemoral arthritis      Plan:     The risks and benefits of an injection were discussed with the patient. The patient had full opportunity to ask questions and all were answered. The patient then provided verbal informed consent. The skin was then prepped with betadine solution and alcohol. Under aseptic conditions, the  bilateral knees were injected with 4cc of 1% xylocaine, 4cc of 0.25% marcaine, and 1cc of Kenalog (40mg/ml). There were no immediate complications following the injection. The patient was advised of the possibility of injection site reaction and instructed to apply ice to the area and take NSAIDs if able. NSAIDs prn. Ice prn. Handout with patellofemoral HEP provided. Follow up 3 months prn injection.

## 2019-04-17 PROBLEM — R87.619 ATYPICAL GLANDULAR CELLS OF UNDETERMINED SIGNIFICANCE (AGUS) ON CERVICAL PAP SMEAR: Status: ACTIVE | Noted: 2019-04-17

## 2019-09-04 ENCOUNTER — OFFICE VISIT (OUTPATIENT)
Dept: DERMATOLOGY | Age: 69
End: 2019-09-04
Payer: MEDICARE

## 2019-09-04 DIAGNOSIS — L57.0 ACTINIC KERATOSIS: Primary | ICD-10-CM

## 2019-09-04 DIAGNOSIS — Z85.828 HISTORY OF BASAL CELL CARCINOMA (BCC): ICD-10-CM

## 2019-09-04 DIAGNOSIS — L82.1 SK (SEBORRHEIC KERATOSIS): ICD-10-CM

## 2019-09-04 PROCEDURE — G8420 CALC BMI NORM PARAMETERS: HCPCS | Performed by: DERMATOLOGY

## 2019-09-04 PROCEDURE — G8399 PT W/DXA RESULTS DOCUMENT: HCPCS | Performed by: DERMATOLOGY

## 2019-09-04 PROCEDURE — 99213 OFFICE O/P EST LOW 20 MIN: CPT | Performed by: DERMATOLOGY

## 2019-09-04 PROCEDURE — 1036F TOBACCO NON-USER: CPT | Performed by: DERMATOLOGY

## 2019-09-04 PROCEDURE — 3017F COLORECTAL CA SCREEN DOC REV: CPT | Performed by: DERMATOLOGY

## 2019-09-04 PROCEDURE — 4040F PNEUMOC VAC/ADMIN/RCVD: CPT | Performed by: DERMATOLOGY

## 2019-09-04 PROCEDURE — 1123F ACP DISCUSS/DSCN MKR DOCD: CPT | Performed by: DERMATOLOGY

## 2019-09-04 PROCEDURE — 1090F PRES/ABSN URINE INCON ASSESS: CPT | Performed by: DERMATOLOGY

## 2019-09-04 PROCEDURE — G8427 DOCREV CUR MEDS BY ELIG CLIN: HCPCS | Performed by: DERMATOLOGY

## 2019-09-04 NOTE — PROGRESS NOTES
[facility] Dermatology  Erich Hamlin MD  [phone]      Beto Miramontes  1950    79 y.o. female     Date of Visit: 9/4/2019    Chief Complaint: skin lesions    History of Present Illness:    1. She complains of an asymptomatic skin lesion on the R side of the nose. 2.  She reports multiple growths on the scalp and back. None are symptomatic. 3.  She has a history of multiple BCCs-denies any signs of recurrence. Nodular BCC of the right nasal ala-treated with Mohs by Dr. Wright Daily on 10/3/2018. BCC of the nasal tip - treated with Mohs by [place], repair by [place] in 2010. BCC on the forehead - treated with EDC in 1999.     Dysplastic nevus on the right upper back 2007. Review of Systems:  Skin: No new or changing moles. Past Medical History, Family History, Surgical History, Medications and Allergies reviewed.     Past Medical History:   Diagnosis Date    Arthritis     Colon cancer ([address]     Narrow angle glaucoma suspect of both eyes 1/9/2017    PONV (postoperative nausea and vomiting)     Skin cancer     Thyroid disease      Past Surgical History:   Procedure Laterality Date    BREAST BIOPSY Left 2018    benign    COLECTOMY  2011    cancerous polyp    COLONOSCOPY  09/16/2015    DILATION AND CURETTAGE OF UTERUS N/A 1/8/2019    benign- HYSTEROSCOPY DILATATION AND CURETTAGE performed by Christa De La O MD at [place] [address]  10/03/2018    R ala BCC    SKIN CANCER EXCISION      THYROID SURGERY  6/2011    BX        Allergies   Allergen Reactions    Erythromycin Shortness Of Breath    Adhesive Tape Rash     bandaids    Neosporin [Neomycin-Polymyxin-Gramicidin] Itching and Swelling     Any medication that ends in \"sporins\" - high sensitivity per PT      Outpatient Medications Marked as Taking for the 9/4/19 encounter (Office Visit) with Javier Tillman MD   Medication Sig Dispense Refill    magnesium 30 MG tablet Take 250 mg by mouth daily

## 2019-11-21 ENCOUNTER — HOSPITAL ENCOUNTER (OUTPATIENT)
Dept: WOMENS IMAGING | Age: 69
Discharge: HOME | End: 2019-11-21
Payer: MEDICARE

## 2019-11-21 DIAGNOSIS — Z12.31 VISIT FOR SCREENING MAMMOGRAM: ICD-10-CM

## 2019-11-21 PROCEDURE — 77063 BREAST TOMOSYNTHESIS BI: CPT

## 2020-03-04 ENCOUNTER — OFFICE VISIT (OUTPATIENT)
Dept: DERMATOLOGY | Age: 70
End: 2020-03-04
Payer: MEDICARE

## 2020-03-04 PROCEDURE — 99213 OFFICE O/P EST LOW 20 MIN: CPT | Performed by: DERMATOLOGY

## 2020-03-04 PROCEDURE — G8399 PT W/DXA RESULTS DOCUMENT: HCPCS | Performed by: DERMATOLOGY

## 2020-03-04 PROCEDURE — 1090F PRES/ABSN URINE INCON ASSESS: CPT | Performed by: DERMATOLOGY

## 2020-03-04 PROCEDURE — G8427 DOCREV CUR MEDS BY ELIG CLIN: HCPCS | Performed by: DERMATOLOGY

## 2020-03-04 PROCEDURE — 1123F ACP DISCUSS/DSCN MKR DOCD: CPT | Performed by: DERMATOLOGY

## 2020-03-04 PROCEDURE — 11102 TANGNTL BX SKIN SINGLE LES: CPT | Performed by: DERMATOLOGY

## 2020-03-04 PROCEDURE — 3017F COLORECTAL CA SCREEN DOC REV: CPT | Performed by: DERMATOLOGY

## 2020-03-04 PROCEDURE — G8420 CALC BMI NORM PARAMETERS: HCPCS | Performed by: DERMATOLOGY

## 2020-03-04 PROCEDURE — 1036F TOBACCO NON-USER: CPT | Performed by: DERMATOLOGY

## 2020-03-04 PROCEDURE — 4040F PNEUMOC VAC/ADMIN/RCVD: CPT | Performed by: DERMATOLOGY

## 2020-03-04 PROCEDURE — G8484 FLU IMMUNIZE NO ADMIN: HCPCS | Performed by: DERMATOLOGY

## 2020-03-04 NOTE — PROGRESS NOTES
[facility]  Cody Casas MD  [phone]      Patti Samples  1950    81 y.o. female     Date of Visit: 3/4/2020    Chief Complaint: skin lesions    History of Present Illness:    1. She reports numerous asymptomatic lesions on the trunk. 2.  Unknown duration of a persistent pink lesion on the right lower cheek. 3.  Has history of BCCs - denies any signs of recurrence. Nodular BCC of the right nasal ala-treated with Mohs by Dr. Mary Olvera on 10/3/2018. BCC of the nasal tip - treated with Mohs by [place], repair by [employer] in 2010. BCC on the forehead - treated with EDC in 1999.     Dysplastic nevus on the right upper back 2007. Review of Systems:  Skin: No new or changing moles. Past Medical History, Family History, Surgical History, Medications and Allergies reviewed.     Past Medical History:   Diagnosis Date    Arthritis     Colon cancer ([address]     Narrow angle glaucoma suspect of both eyes 1/9/2017    PONV (postoperative nausea and vomiting)     Skin cancer     Thyroid disease      Past Surgical History:   Procedure Laterality Date    BREAST BIOPSY Left 2018    benign    COLECTOMY  2011    cancerous polyp    COLONOSCOPY  09/16/2015    DILATION AND CURETTAGE OF UTERUS N/A 1/8/2019    benign- HYSTEROSCOPY DILATATION AND CURETTAGE performed by Marella Duverney, MD at [place] [address]  10/03/2018    R ala BCC    SKIN CANCER EXCISION      THYROID SURGERY  6/2011    BX        Allergies   Allergen Reactions    Erythromycin Shortness Of Breath    Adhesive Tape Rash     bandaids    Neosporin [Neomycin-Polymyxin-Gramicidin] Itching and Swelling     Any medication that ends in \"sporins\" - high sensitivity per PT      Outpatient Medications Marked as Taking for the 3/4/20 encounter (Office Visit) with George Adkins MD   Medication Sig Dispense Refill    ibuprofen (ADVIL;MOTRIN) 200 MG tablet Take 200 mg by mouth every 6 hours as needed

## 2020-03-04 NOTE — PATIENT INSTRUCTIONS

## 2020-03-06 LAB — DERMATOLOGY PATHOLOGY REPORT: NORMAL

## 2020-08-07 ENCOUNTER — TELEMEDICINE (OUTPATIENT)
Dept: FAMILY MEDICINE CLINIC | Age: 70
End: 2020-08-07
Payer: MEDICARE

## 2020-08-07 VITALS — BODY MASS INDEX: 24.01 KG/M2 | WEIGHT: 151 LBS

## 2020-08-07 PROCEDURE — G0439 PPPS, SUBSEQ VISIT: HCPCS | Performed by: FAMILY MEDICINE

## 2020-08-07 PROCEDURE — 3017F COLORECTAL CA SCREEN DOC REV: CPT | Performed by: FAMILY MEDICINE

## 2020-08-07 PROCEDURE — 4040F PNEUMOC VAC/ADMIN/RCVD: CPT | Performed by: FAMILY MEDICINE

## 2020-08-07 PROCEDURE — 1123F ACP DISCUSS/DSCN MKR DOCD: CPT | Performed by: FAMILY MEDICINE

## 2020-08-07 ASSESSMENT — LIFESTYLE VARIABLES
HOW OFTEN DURING THE LAST YEAR HAVE YOU FOUND THAT YOU WERE NOT ABLE TO STOP DRINKING ONCE YOU HAD STARTED: 0
HOW OFTEN DURING THE LAST YEAR HAVE YOU HAD A FEELING OF GUILT OR REMORSE AFTER DRINKING: 0
HOW OFTEN DURING THE LAST YEAR HAVE YOU NEEDED AN ALCOHOLIC DRINK FIRST THING IN THE MORNING TO GET YOURSELF GOING AFTER A NIGHT OF HEAVY DRINKING: 0
HOW OFTEN DURING THE LAST YEAR HAVE YOU BEEN UNABLE TO REMEMBER WHAT HAPPENED THE NIGHT BEFORE BECAUSE YOU HAD BEEN DRINKING: 0
AUDIT TOTAL SCORE: 2
HAVE YOU OR SOMEONE ELSE BEEN INJURED AS A RESULT OF YOUR DRINKING: 0
HOW MANY STANDARD DRINKS CONTAINING ALCOHOL DO YOU HAVE ON A TYPICAL DAY: 0
HOW OFTEN DURING THE LAST YEAR HAVE YOU FAILED TO DO WHAT WAS NORMALLY EXPECTED FROM YOU BECAUSE OF DRINKING: 0
AUDIT-C TOTAL SCORE: 2
HOW OFTEN DO YOU HAVE SIX OR MORE DRINKS ON ONE OCCASION: 0
HAS A RELATIVE, FRIEND, DOCTOR, OR ANOTHER HEALTH PROFESSIONAL EXPRESSED CONCERN ABOUT YOUR DRINKING OR SUGGESTED YOU CUT DOWN: 0
HOW OFTEN DO YOU HAVE A DRINK CONTAINING ALCOHOL: 2

## 2020-08-07 ASSESSMENT — PATIENT HEALTH QUESTIONNAIRE - PHQ9
SUM OF ALL RESPONSES TO PHQ QUESTIONS 1-9: 0
SUM OF ALL RESPONSES TO PHQ QUESTIONS 1-9: 0

## 2020-08-07 NOTE — PROGRESS NOTES
PHONE VISIT    Kina Hernandez is a 81 y.o. female evaluated via telephone on 8/7/2020. Consent:  She and/or health care decision maker is aware that that she may receive a bill for this telephone service, depending on her insurance coverage, and has provided verbal consent to proceed: Yes    I affirm this is a Patient Initiated Episode with an Established Patient who has not had a related appointment within my department in the past 7 days or scheduled within the next 24 hours. Medicare Annual Wellness Visit  Name: Kina Hernandez  YOB: 1950  Age: 81 y.o. Sex: female  MRN: [id number]     Date of Service:  8/7/2020    Chief Complaint:   Kina Hernandez is a 81 y.o. female who presents for Medicare Annual Wellness Visit and check-up for:  1. Routine general medical examination at a health care facility    2. Osteopenia of multiple sites    3. Multinodular goiter    4. History of basal cell carcinoma    5. Episodic tension-type headache, not intractable    6. Atypical glandular cells of undetermined significance (ANAYELI) on cervical Pap smear 11/18 per GYN    7. NSAID long-term use      HPI    Chief Complaint   Patient presents with    Medicare AWV   Complaints: pt is doing well no new issues   See gyn and derm routinely  Still occasional headache  OA in hips and knees. Sees ortho. Failed injections. Not want surgeon. Take 400 mg ibu a day    Review of Systems   General ROS: fever? No,    Night sweats? No  Ophthalmic ROS: change in vision? No  Endocrine ROS: fatigue? No   Unexpected weight changes? No  Hematologic/Lymphatic: easy bruising? No   Swollen lymph nodes? No  ENT ROS: headaches? No   Sore throat? No  Respiratory ROS: cough? No   Wheezing? No  Cardiovascular ROS: chest pain? No   Shortness of breath? No  Gastrointestinal ROS: abdominal pain? No   Change in stools? No  Genito-Urinary ROS: painful urination? No   Trouble urinating?    No  Musculoskeletal ROS: trouble walking? No   Joint pain? Yes  Neurological ROS: TIA or stroke symptoms? No   Numbness/tingling? No  Dermatological ROS: rash? No   Changes in skin spots? No    HISTORY:  Patient's medications, allergies, past medical, and social histories were reviewed and updated as appropriate. CHART REVIEW  Health Maintenance   Topic Date Due    Shingles Vaccine (2 of 3) 04/26/2013    Annual Wellness Visit (AWV)  05/29/2019    Pneumococcal 65+ years Vaccine (2 of 2 - PPSV23) 12/26/2019    Flu vaccine (1) 09/01/2020    Colon cancer screen colonoscopy  10/01/2020    DEXA (modify frequency per FRAX score)  11/22/2020    Breast cancer screen  11/21/2021    Cervical cancer screen  11/26/2021    DTaP/Tdap/Td vaccine (2 - Td) 02/17/2022    Lipid screen  03/25/2024    Hepatitis C screen  Completed    Hepatitis A vaccine  Aged Out    Hepatitis B vaccine  Aged Out    Hib vaccine  Aged Out    Meningococcal (ACWY) vaccine  Aged Out     The ASCVD Risk score (Moni Eli, et al., 2013) failed to calculate for the following reasons: The systolic blood pressure is missing  Prior to Visit Medications    Medication Sig Taking? Authorizing Provider   ibuprofen (ADVIL;MOTRIN) 200 MG tablet Take 200 mg by mouth every 6 hours as needed for Pain Yes Historical Provider, MD   magnesium 30 MG tablet Take 250 mg by mouth daily Yes Historical Provider, MD   Multiple Vitamin (MULTIVITAMIN PO) Take  by mouth daily. Yes Historical Provider, MD   FIBER PO Take  by mouth daily. Yes Historical Provider, MD   Calcium Carbonate-Vit D-Min (CALCIUM 1200 PO) Take  by mouth daily.    Yes Historical Provider, MD      Family History   Problem Relation Age of Onset    Cancer Father         malkristin. melanoma     Heart Disease Maternal Grandfather     Osteoporosis Mother     Diabetes Neg Hx     High Cholesterol Neg Hx     Hypertension Neg Hx     Stroke Neg Hx     Thyroid Disease Neg Hx     Seizures Neg Hx      Social History Tobacco Use    Smoking status: Never Smoker    Smokeless tobacco: Never Used    Tobacco comment: advised not to start   Substance Use Topics    Alcohol use:  Yes     Alcohol/week: 2.0 standard drinks     Types: 1 Standard drinks or equivalent, 1 Glasses of wine per week    Drug use: No      Immunization History   Administered Date(s) Administered    Influenza Vaccine, unspecified formulation 10/01/2014, 10/12/2016    Influenza, High Dose (Fluzone 65 yrs and older) 10/11/2018    Influenza, Intradermal, Quadrivalent, Preservative Free 10/09/2017    Pneumococcal Conjugate 13-valent ([id number]) 12/26/2018    Tdap (Boostrix, Adacel) 02/17/2012    Zoster Live (Zostavax) 03/01/2013     LAST LABS  Cholesterol, Total   Date Value Ref Range Status   03/25/2019 157 0 - 199 mg/dL Final     LDL Calculated   Date Value Ref Range Status   03/25/2019 85 <100 mg/dL Final     HDL   Date Value Ref Range Status   03/25/2019 62 (H) 40 - 60 mg/dL Final   06/05/2012 62 (H) 40 - 60 mg/dl Final     Triglycerides   Date Value Ref Range Status   03/25/2019 50 0 - 150 mg/dL Final     Lab Results   Component Value Date    GLUCOSE 85 03/25/2019     Lab Results   Component Value Date     03/25/2019    K 4.5 03/25/2019    CREATININE 0.7 03/25/2019     Lab Results   Component Value Date    WBC 6.1 01/08/2019    HGB 14.5 01/08/2019    HCT 43.9 01/08/2019    MCV 95.6 01/08/2019     01/08/2019     Lab Results   Component Value Date    ALT 15 03/25/2019    AST 20 03/25/2019    ALKPHOS 63 03/25/2019    BILITOT 1.8 (H) 03/25/2019     TSH (uIU/mL)   Date Value   03/25/2019 0.64     No results found for: LABA1C   CareTeam (Including outside providers/suppliers regularly involved in providing care):   Patient Care Team:  Linda Reyes MD as PCP - Phong Sanchez MD as PCP - [facility] Empaneled Provider  Jerrica Chan MD as Consulting Physician (Endocrinology)  The following problems were reviewed today and where indicated follow up appointments were made and/or referrals ordered. Positive Risk Factor Screenings with Interventions:     No Positive Risk Factors identified today. Current Health Maintenance Status  Recommendations for Preventive Services Due: see orders. Recommended screening schedule for the next 5-10 years is provided to the patient in written form: see Patient Instructions/AVS.    PHYSICAL EXAM:   Objective:   PHYSICAL EXAM:  Vitals (if available)       BP Readings from Last 3 Encounters:   04/10/19 99/62   03/25/19 106/68   01/08/19 (!) 90/51     Wt Readings from Last 3 Encounters:   08/07/20 151 lb (68.5 kg)   04/10/19 153 lb (69.4 kg)   03/25/19 153 lb (69.4 kg)        Assessment and Plan:      Diagnosis Orders   1. Routine general medical examination at a health care facility     2. Osteopenia of multiple sites     3. Multinodular goiter     4. History of basal cell carcinoma     5. Episodic tension-type headache, not intractable     6. Atypical glandular cells of undetermined significance (ANAYELI) on cervical Pap smear 11/18 per GYN     7. NSAID long-term use  Basic Metabolic Panel     Stable. Plan as above and below. INSTRUCTIONS  NEXT APPOINTMENT: Please schedule fasting annual physical (30 minutes) in one year. OK to have water, black coffee and medications (except for diabetes medicines). · Please get flu vaccine when available in fall. Can get either at this office or at stores such as [employer] and [address]. · Needs Pneumovax. . Can get at office of pharmacy. · PLEASE GET BLOODWORK DRAWN soon ON FIRST FLOOR in 170. RESULTS- most blood tests back in couple days. We will call you if any problems. If bloodwork good, you will get letter in mail or notified thru [address] (if signed up) within 2 weeks. If you do not, please call office. · Medicare part D patients:  Get Shingrix shingles vaccine at pharmacy (such as [facility] or [facility]). Need second dose in 2-6 months.  NOTE- vaccine on back

## 2020-08-07 NOTE — PATIENT INSTRUCTIONS
Personalized Preventive Plan for Justus Cameron - 8/7/2020  Medicare offers a range of preventive health benefits. Some of the tests and screenings are paid in full while other may be subject to a deductible, co-insurance, and/or copay. Some of these benefits include a comprehensive review of your medical history including lifestyle, illnesses that may run in your family, and various assessments and screenings as appropriate. After reviewing your medical record and screening and assessments performed today your provider may have ordered immunizations, labs, imaging, and/or referrals for you. A list of these orders (if applicable) as well as your Preventive Care list are included within your After Visit Summary for your review. Other Preventive Recommendations:    · A preventive eye exam performed by an eye specialist is recommended every 1-2 years to screen for glaucoma; cataracts, macular degeneration, and other eye disorders. · A preventive dental visit is recommended every 6 months. · Try to get at least 150 minutes of exercise per week or 10,000 steps per day on a pedometer . · Order or download the FREE \"Exercise & Physical Activity: Your Everyday Guide\" from [employer]. Call 7-238.569.6636 or search [employer] online. · You need 1794-6476 mg of calcium and 4896-0110 IU of vitamin D per day. It is possible to meet your calcium requirement with diet alone, but a vitamin D supplement is usually necessary to meet this goal.  · When exposed to the sun, use a sunscreen that protects against both UVA and UVB radiation with an SPF of 30 or greater. Reapply every 2 to 3 hours or after sweating, drying off with a towel, or swimming. · Always wear a seat belt when traveling in a car. Always wear a helmet when riding a bicycle or motorcycle.

## 2020-08-12 DIAGNOSIS — Z79.1 NSAID LONG-TERM USE: ICD-10-CM

## 2020-08-12 LAB
ANION GAP SERPL CALCULATED.3IONS-SCNC: 10 MMOL/L (ref 3–16)
BUN BLDV-MCNC: 13 MG/DL (ref 7–20)
CALCIUM SERPL-MCNC: 9.4 MG/DL (ref 8.3–10.6)
CHLORIDE BLD-SCNC: 106 MMOL/L (ref 99–110)
CO2: 26 MMOL/L (ref 21–32)
CREAT SERPL-MCNC: 0.8 MG/DL (ref 0.6–1.2)
GFR AFRICAN AMERICAN: >60
GFR NON-AFRICAN AMERICAN: >60
GLUCOSE BLD-MCNC: 100 MG/DL (ref 70–99)
POTASSIUM SERPL-SCNC: 4.1 MMOL/L (ref 3.5–5.1)
SODIUM BLD-SCNC: 142 MMOL/L (ref 136–145)

## 2020-09-10 ENCOUNTER — OFFICE VISIT (OUTPATIENT)
Dept: DERMATOLOGY | Age: 70
End: 2020-09-10
Payer: MEDICARE

## 2020-09-10 VITALS — TEMPERATURE: 97.5 F

## 2020-09-10 PROCEDURE — 11102 TANGNTL BX SKIN SINGLE LES: CPT | Performed by: DERMATOLOGY

## 2020-09-10 PROCEDURE — G8399 PT W/DXA RESULTS DOCUMENT: HCPCS | Performed by: DERMATOLOGY

## 2020-09-10 PROCEDURE — 4040F PNEUMOC VAC/ADMIN/RCVD: CPT | Performed by: DERMATOLOGY

## 2020-09-10 PROCEDURE — 3017F COLORECTAL CA SCREEN DOC REV: CPT | Performed by: DERMATOLOGY

## 2020-09-10 PROCEDURE — 1090F PRES/ABSN URINE INCON ASSESS: CPT | Performed by: DERMATOLOGY

## 2020-09-10 PROCEDURE — G8427 DOCREV CUR MEDS BY ELIG CLIN: HCPCS | Performed by: DERMATOLOGY

## 2020-09-10 PROCEDURE — 99213 OFFICE O/P EST LOW 20 MIN: CPT | Performed by: DERMATOLOGY

## 2020-09-10 PROCEDURE — G8420 CALC BMI NORM PARAMETERS: HCPCS | Performed by: DERMATOLOGY

## 2020-09-10 PROCEDURE — 1123F ACP DISCUSS/DSCN MKR DOCD: CPT | Performed by: DERMATOLOGY

## 2020-09-10 PROCEDURE — 1036F TOBACCO NON-USER: CPT | Performed by: DERMATOLOGY

## 2020-09-10 NOTE — PROGRESS NOTES
[facility] Dermatology  Marielle Santos MD  [phone]      Mertie Meckel  1950    81 y.o. female     Date of Visit: 9/10/2020    Chief Complaint: skin moles    History of Present Illness:    1. She presents today for a full skin examination. She has multiple moles on the trunk and extremities-not of any changes in size, color, shape. 2.  She also complains of multiple growths on the trunk. None are bothersome. 3.  She also complains of a persistent intermittently scabbed lesion on the right chin. 4.  She has a history of multiple BCCs-denies any signs of recurrence. Nodular BCC of the right nasal ala-treated with Mohs by Dr. Vamsi Arcos on 10/3/2018. BCC of the nasal tip - treated with Mohs by [place], repair by [place] in 2010. BCC on the forehead - treated with EDC in 1999.     Dysplastic nevus on the right upper back 2007. Review of Systems:  Skin: No new or changing moles. Past Medical History, Family History, Surgical History, Medications and Allergies reviewed.     Past Medical History:   Diagnosis Date    Arthritis     Colon cancer ([address]     Narrow angle glaucoma suspect of both eyes 1/9/2017    PONV (postoperative nausea and vomiting)     Skin cancer     Thyroid disease      Past Surgical History:   Procedure Laterality Date    BREAST BIOPSY Left 2018    benign    COLECTOMY  2011    cancerous polyp    COLONOSCOPY  09/16/2015    DILATION AND CURETTAGE OF UTERUS N/A 1/8/2019    benign- HYSTEROSCOPY DILATATION AND CURETTAGE performed by Gerber Wong MD at [place] [address]  10/03/2018    R ala BCC    SKIN CANCER EXCISION      THYROID SURGERY  6/2011    BX        Allergies   Allergen Reactions    Erythromycin Shortness Of Breath    Adhesive Tape Rash     bandaids    Neosporin [Neomycin-Polymyxin-Gramicidin] Itching and Swelling     Any medication that ends in \"sporins\" - high sensitivity per PT      Outpatient Medications Marked as bleeding, scar, and infection. 4. History of basal cell carcinomas - clear    Sun protective behaviors and self skin examinations were encouraged. Call for any new or concerning lesions. Return in about 1 year (around 9/10/2021).

## 2020-09-15 LAB — DERMATOLOGY PATHOLOGY REPORT: ABNORMAL

## 2020-10-02 ENCOUNTER — PROCEDURE VISIT (OUTPATIENT)
Dept: DERMATOLOGY | Age: 70
End: 2020-10-02
Payer: MEDICARE

## 2020-10-02 VITALS — TEMPERATURE: 97.3 F

## 2020-10-02 PROCEDURE — 17260 DSTRJ MAL LES T/A/L 0.5 CM/<: CPT | Performed by: DERMATOLOGY

## 2020-10-02 NOTE — PROGRESS NOTES
[facility] Dermatology  Rosi Marroquin MD  [phone]      Stephanie Stephens  1950    81 y.o. female     Date of Visit: 10/2/2020    Chief Complaint: [facility]    History of Present Illness:    She presents today for treatment of a superficial BCC on the right shin. RESULTS   DIAGNOSIS   DIAGNOSIS:   RIGHT SHIN-   Basal Cell Carcinoma, Superficial Type   Multiple, apparently discrete nests of basal cell carcinoma   arise from the basal layer of the epidermis and are   spreading laterally and slightly downward into the   superficial dermis. Liss Coyle MD   Electronic Signature: 15 SEP 2020 12:13 PM       Review of Systems:  Gen: Feels well, good sense of health. Skin: No new or changing moles, no history of keloids or hypertrophic scars. Heme: No abnormal bruising or bleeding. Past Medical History, Family History, Surgical History, Medications and Allergies reviewed.     Past Medical History:   Diagnosis Date    Arthritis     Colon cancer ([address]     Narrow angle glaucoma suspect of both eyes 1/9/2017    PONV (postoperative nausea and vomiting)     Skin cancer     Thyroid disease      Past Surgical History:   Procedure Laterality Date    BREAST BIOPSY Left 2018    benign    COLECTOMY  2011    cancerous polyp    COLONOSCOPY  09/16/2015    DILATION AND CURETTAGE OF UTERUS N/A 1/8/2019    benign- HYSTEROSCOPY DILATATION AND CURETTAGE performed by Nancy Dawkins MD at [place] [address]  10/03/2018    R ala BCC    SKIN CANCER EXCISION      THYROID SURGERY  6/2011    BX        Allergies   Allergen Reactions    Erythromycin Shortness Of Breath    Adhesive Tape Rash     bandaids    Neosporin [Neomycin-Polymyxin-Gramicidin] Itching and Swelling     Any medication that ends in \"sporins\" - high sensitivity per PT      Outpatient Medications Marked as Taking for the 10/2/20 encounter (Procedure visit) with Lorraine Morrow MD   Medication Sig Dispense Refill    ibuprofen (ADVIL;MOTRIN) 200 MG tablet Take 200 mg by mouth every 6 hours as needed for Pain      magnesium 30 MG tablet Take 250 mg by mouth daily      Multiple Vitamin (MULTIVITAMIN PO) Take  by mouth daily.  FIBER PO Take  by mouth daily.  Calcium Carbonate-Vit D-Min (CALCIUM 1200 PO) Take  by mouth daily. Physical Examination       Well appearing. 1.  Right shin - 5 mm eroded pink macule. Assessment and Plan     1. Superficial basal cell carcinoma (BCC) of right lower leg - 5 mm    The area to be treated with cleansed with alcohol and marked with surgical marking pen. Local anesthesia was acheived with 1% lidocaine with epinephrine. Sharp curettage was performed in 3 different directions. Hemostasis was obtained with aluminum chloride. The wound was dressed with petrolatum and a bandage. Patient was educated regarding risks including bleeding, discomfort, and risk of recurrence.

## 2020-11-04 ENCOUNTER — OFFICE VISIT (OUTPATIENT)
Dept: PRIMARY CARE CLINIC | Age: 70
End: 2020-11-04
Payer: MEDICARE

## 2020-11-04 PROCEDURE — 99212 OFFICE O/P EST SF 10 MIN: CPT | Performed by: NURSE PRACTITIONER

## 2020-11-06 LAB — SARS-COV-2: NOT DETECTED

## 2020-11-09 ENCOUNTER — ANESTHESIA EVENT (OUTPATIENT)
Dept: ENDOSCOPY | Age: 70
End: 2020-11-09
Payer: MEDICARE

## 2020-11-10 ENCOUNTER — ANESTHESIA (OUTPATIENT)
Dept: ENDOSCOPY | Age: 70
End: 2020-11-10
Payer: MEDICARE

## 2020-11-10 ENCOUNTER — HOSPITAL ENCOUNTER (OUTPATIENT)
Age: 70
Setting detail: OUTPATIENT SURGERY
Discharge: HOME | End: 2020-11-10
Attending: INTERNAL MEDICINE | Admitting: INTERNAL MEDICINE
Payer: MEDICARE

## 2020-11-10 VITALS
WEIGHT: 159.4 LBS | HEIGHT: 68 IN | SYSTOLIC BLOOD PRESSURE: 105 MMHG | OXYGEN SATURATION: 98 % | RESPIRATION RATE: 18 BRPM | HEART RATE: 67 BPM | TEMPERATURE: 97 F | BODY MASS INDEX: 24.16 KG/M2 | DIASTOLIC BLOOD PRESSURE: 57 MMHG

## 2020-11-10 VITALS — SYSTOLIC BLOOD PRESSURE: 99 MMHG | DIASTOLIC BLOOD PRESSURE: 63 MMHG | OXYGEN SATURATION: 95 %

## 2020-11-10 PROCEDURE — 7100000011 HC PHASE II RECOVERY - ADDTL 15 MIN: Performed by: INTERNAL MEDICINE

## 2020-11-10 PROCEDURE — 3609010300 HC COLONOSCOPY W/BIOPSY SINGLE/MULTIPLE: Performed by: INTERNAL MEDICINE

## 2020-11-10 PROCEDURE — 3700000001 HC ADD 15 MINUTES (ANESTHESIA): Performed by: INTERNAL MEDICINE

## 2020-11-10 PROCEDURE — 3700000000 HC ANESTHESIA ATTENDED CARE: Performed by: INTERNAL MEDICINE

## 2020-11-10 PROCEDURE — 88305 TISSUE EXAM BY PATHOLOGIST: CPT

## 2020-11-10 PROCEDURE — 2709999900 HC NON-CHARGEABLE SUPPLY: Performed by: INTERNAL MEDICINE

## 2020-11-10 PROCEDURE — 7100000010 HC PHASE II RECOVERY - FIRST 15 MIN: Performed by: INTERNAL MEDICINE

## 2020-11-10 PROCEDURE — 2580000003 HC RX 258: Performed by: ANESTHESIOLOGY

## 2020-11-10 PROCEDURE — 6360000002 HC RX W HCPCS: Performed by: NURSE ANESTHETIST, CERTIFIED REGISTERED

## 2020-11-10 RX ORDER — PROPOFOL 10 MG/ML
INJECTION, EMULSION INTRAVENOUS PRN
Status: DISCONTINUED | OUTPATIENT
Start: 2020-11-10 | End: 2020-11-10 | Stop reason: SDUPTHER

## 2020-11-10 RX ORDER — SODIUM CHLORIDE 0.9 % (FLUSH) 0.9 %
10 SYRINGE (ML) INJECTION PRN
Status: DISCONTINUED | OUTPATIENT
Start: 2020-11-10 | End: 2020-11-10 | Stop reason: HOSPADM

## 2020-11-10 RX ORDER — LABETALOL HYDROCHLORIDE 5 MG/ML
5 INJECTION, SOLUTION INTRAVENOUS EVERY 10 MIN PRN
Status: DISCONTINUED | OUTPATIENT
Start: 2020-11-10 | End: 2020-11-10 | Stop reason: HOSPADM

## 2020-11-10 RX ORDER — PROMETHAZINE HYDROCHLORIDE 25 MG/ML
6.25 INJECTION, SOLUTION INTRAMUSCULAR; INTRAVENOUS
Status: DISCONTINUED | OUTPATIENT
Start: 2020-11-10 | End: 2020-11-10 | Stop reason: HOSPADM

## 2020-11-10 RX ORDER — ONDANSETRON 2 MG/ML
4 INJECTION INTRAMUSCULAR; INTRAVENOUS
Status: DISCONTINUED | OUTPATIENT
Start: 2020-11-10 | End: 2020-11-10 | Stop reason: HOSPADM

## 2020-11-10 RX ORDER — SODIUM CHLORIDE 0.9 % (FLUSH) 0.9 %
10 SYRINGE (ML) INJECTION EVERY 12 HOURS SCHEDULED
Status: DISCONTINUED | OUTPATIENT
Start: 2020-11-10 | End: 2020-11-10 | Stop reason: HOSPADM

## 2020-11-10 RX ORDER — LANOLIN ALCOHOL/MO/W.PET/CERES
10 CREAM (GRAM) TOPICAL NIGHTLY PRN
COMMUNITY

## 2020-11-10 RX ORDER — ONDANSETRON 2 MG/ML
INJECTION INTRAMUSCULAR; INTRAVENOUS PRN
Status: DISCONTINUED | OUTPATIENT
Start: 2020-11-10 | End: 2020-11-10 | Stop reason: SDUPTHER

## 2020-11-10 RX ORDER — SODIUM CHLORIDE 9 MG/ML
INJECTION, SOLUTION INTRAVENOUS CONTINUOUS
Status: DISCONTINUED | OUTPATIENT
Start: 2020-11-10 | End: 2020-11-10 | Stop reason: HOSPADM

## 2020-11-10 RX ADMIN — SODIUM CHLORIDE: 9 INJECTION, SOLUTION INTRAVENOUS at 07:17

## 2020-11-10 RX ADMIN — ONDANSETRON 4 MG: 2 INJECTION INTRAMUSCULAR; INTRAVENOUS at 07:32

## 2020-11-10 RX ADMIN — SODIUM CHLORIDE: 9 INJECTION, SOLUTION INTRAVENOUS at 07:29

## 2020-11-10 RX ADMIN — PROPOFOL 50 MG: 10 INJECTION, EMULSION INTRAVENOUS at 07:41

## 2020-11-10 RX ADMIN — PROPOFOL 50 MG: 10 INJECTION, EMULSION INTRAVENOUS at 07:34

## 2020-11-10 RX ADMIN — PROPOFOL 50 MG: 10 INJECTION, EMULSION INTRAVENOUS at 07:37

## 2020-11-10 RX ADMIN — PROPOFOL 100 MG: 10 INJECTION, EMULSION INTRAVENOUS at 07:31

## 2020-11-10 ASSESSMENT — PAIN SCALES - GENERAL
PAINLEVEL_OUTOF10: 0

## 2020-11-10 ASSESSMENT — PAIN - FUNCTIONAL ASSESSMENT: PAIN_FUNCTIONAL_ASSESSMENT: 0-10

## 2020-11-10 NOTE — OP NOTE
Operative Note      Patient: Zana Dwyer  YOB: 1950  MRN: [id number]    Date of Procedure: 11/10/2020    Pre-Op Diagnosis: History of colon polyps    Post-Op Diagnosis: Same       Procedure(s):  COLONOSCOPY WITH BIOPSY    Surgeon(s):  Pau Perez MD    Assistant:   * No surgical staff found *    Anesthesia: Monitor Anesthesia Care    Estimated Blood Loss (mL): None    Complications: None    Specimens:   ID Type Source Tests Collected by Time Destination   A : a. low anterior resection rectal polyp Tissue Colon SURGICAL PATHOLOGY Pau Perez MD 11/10/2020 9803        Implants:  * No implants in log *      Drains: * No LDAs found *    Findings: See dictated report    Detailed Description of Procedure:   See dictated report    Electronically signed by Pau Perez MD on 11/10/2020 at 7:52 AM

## 2020-11-10 NOTE — BRIEF OP NOTE
Brief Postoperative Note    Trish Rutherford  YOB: 1950  [id number]      Pre-operative Diagnosis: Polyp surveillance    Previous Colonoscopy: Yes  When: 09/16/15  Greater than 3 years? Yes      Post-operative Diagnosis: Same    Procedure: Colonoscopy    The preparation was good.     Anesthesia: MAC    Surgeons/Assistants: Roman Damon MD    Estimated Blood Loss: None    Complications: None    Specimens: Was Obtained: Rectal anastomosis    Findings: See dictated report    Electronically signed by Roman Damon MD on 7/10/2017 at 7:45 AM

## 2020-11-10 NOTE — ANESTHESIA POSTPROCEDURE EVALUATION
[facility] Department of Anesthesiology  Post-Anesthesia Note       Name:  Alexis Garcia                                  Age:  81 y.o. MRN:  [id number]     Last Vitals & Oxygen Saturation: BP (!) 105/57   Pulse 67   Temp 97 °F (36.1 °C) (Temporal)   Resp 18   Ht 5' 8\" (1.727 m)   Wt 159 lb 6.4 oz (72.3 kg)   SpO2 98%   BMI 24.24 kg/m²   Patient Vitals for the past 4 hrs:   BP Temp Temp src Pulse Resp SpO2 Height Weight   11/10/20 0810 (!) 105/57 -- -- 67 18 98 % -- --   11/10/20 0800 109/62 -- -- 73 18 98 % -- --   11/10/20 0750 (!) 93/51 97 °F (36.1 °C) Temporal 79 18 99 % -- --   11/10/20 0713 115/67 97.2 °F (36.2 °C) Temporal 88 16 99 % 5' 8\" (1.727 m) 159 lb 6.4 oz (72.3 kg)       Level of consciousness:  Awake, alert    Respiratory: Respirations easy, no distress. Stable. Cardiovascular: Hemodynamically stable. Hydration: Adequate. PONV: Adequately managed. Post-op pain: Adequately controlled. Post-op assessment: Tolerated anesthetic well without complication. Complications:  None.     Michelle Ortega MD  November 10, 2020   8:22 AM

## 2020-11-10 NOTE — PROCEDURES
[address]                               COLONOSCOPY REPORT    PATIENT NAME: Denisse Franz                  :        1950  MED REC NO:   [id number]                          ROOM:  ACCOUNT NO:   [de-identified]                           ADMIT DATE: 11/10/2020  PROVIDER:     Shelley Troncoso MD    DATE OF PROCEDURE:  11/10/2020    REFERRING PROVIDER:  Sohail Munoz MD    PATIENT HISTORY:  A 46-year-old female, outpatient. INSTRUMENT USED:  Olympus PCF-Q180AL. MEDICATIONS OF PROCEDURE:  The patient was premedicated with Diprivan  intravenously as administered by the anesthesiology service. INDICATIONS:  The patient has a history of colonic polyps. Specifically, she has undergone a low anterior resection for a malignant  rectal polyp in the remote past.    DESCRIPTION OF PROCEDURE:  The digital and anal exams were normal.  The  colonoscope was inserted to the cecum. The prep overall was good. The  surgical anastomosis in the rectum was readily identified. There  appeared to be a small area of apparent granulation tissue at the  anastomosis. This was photo documented and was biopsied. It was  somewhat friable. ESTIMATED BLOOD LOSS:  None. IMPRESSION:  1. Status post low anterior resection. 2.  Probable granulation tissue at the anastomosis, which was biopsied. PLAN:  The patient will call the office for biopsy results. If no  adenomatous tissue is detected on the biopsies, the patient will  undergo a surveillance colonoscopy in five years. JULI Thorpe MD    D: 11/10/2020 8:07:01       T: 11/10/2020 9:49:39     [id number]  Job#: [id number]     Doc#: [id number]    CC:  Juli Pillai MD

## 2020-11-10 NOTE — ANESTHESIA PRE PROCEDURE
[facility] Department of Anesthesiology  Pre-Anesthesia Evaluation/Consultation       Name:  Blu Munoz  : 1950  Age:  81 y.o. MRN:  [id number]  Date: 11/10/2020           Surgeon: Surgeon(s):  Gt Valenzuela MD    Procedure: Procedure(s):  COLONOSCOPY DIAGNOSTIC     Allergies   Allergen Reactions    Erythromycin Shortness Of Breath    Adhesive Tape Rash     bandaids    Neosporin [Neomycin-Polymyxin-Gramicidin] Itching and Swelling     Any medication that ends in \"sporins\" - high sensitivity per PT      Patient Active Problem List   Diagnosis    Multinodular goiter    Osteopenia of multiple sites    History of colon cancer- , resected    Episodic tension-type headache, not intractable    Narrow angle glaucoma suspect of both eyes    Primary osteoarthritis of both knees    History of basal cell carcinoma    Atypical glandular cells of undetermined significance (ANAYELI) on cervical Pap smear  per GYN     Past Medical History:   Diagnosis Date    Arthritis     Colon cancer ([address]     Narrow angle glaucoma suspect of both eyes 2017    PONV (postoperative nausea and vomiting)     Skin cancer     Thyroid disease      Past Surgical History:   Procedure Laterality Date    BREAST BIOPSY Left 2018    benign    COLECTOMY  2011    cancerous polyp    COLONOSCOPY  2015    DILATION AND CURETTAGE OF UTERUS N/A 2019    benign- HYSTEROSCOPY DILATATION AND CURETTAGE performed by Alejandra Lira MD at [place] [address]  10/03/2018    R ala BCC    SKIN CANCER EXCISION      THYROID SURGERY  2011    BX      Social History     Tobacco Use    Smoking status: Never Smoker    Smokeless tobacco: Never Used    Tobacco comment: advised not to start   Substance Use Topics    Alcohol use: Yes     Alcohol/week: 2.0 standard drinks     Types: 1 Standard drinks or equivalent, 1 Glasses of wine per week    Drug use:  No Medications  No current facility-administered medications on file prior to encounter. Current Outpatient Medications on File Prior to Encounter   Medication Sig Dispense Refill    melatonin 3 MG TABS tablet Take 10 mg by mouth nightly as needed      ibuprofen (ADVIL;MOTRIN) 200 MG tablet Take 200 mg by mouth every 6 hours as needed for Pain      magnesium 30 MG tablet Take 250 mg by mouth daily      Multiple Vitamin (MULTIVITAMIN PO) Take  by mouth daily.  FIBER PO Take  by mouth daily.  Calcium Carbonate-Vit D-Min (CALCIUM 1200 PO) Take  by mouth daily. Current Facility-Administered Medications   Medication Dose Route Frequency Provider Last Rate Last Dose    0.9 % sodium chloride infusion   Intravenous Continuous Merlinda Pollen,  mL/hr at 11/10/20 0717      sodium chloride flush 0.9 % injection 10 mL  10 mL Intravenous 2 times per day Merlinda Pollen, MD        sodium chloride flush 0.9 % injection 10 mL  10 mL Intravenous PRN Merlinda Pollen, MD         Vital Signs (Current)   Vitals:    11/10/20 0713   BP: 115/67   Pulse: 88   Resp: 16   Temp: 97.2 °F (36.2 °C)   SpO2: 99%     Vital Signs Statistics (for past 48 hrs)     Temp  Av.2 °F (36.2 °C)  Min: 97.2 °F (36.2 °C)   Min taken time: 11/10/20 0713  Max: 97.2 °F (36.2 °C)   Max taken time: 11/10/20 0713  Pulse  Av  Min: 88   Min taken time: 11/10/20 0713  Max: 88   Max taken time: 11/10/20 0713  Resp  Av  Min: 16   Min taken time: 11/10/20 0713  Max: 16   Max taken time: 11/10/20 0713  BP  Min: 115/67   Min taken time: 11/10/20 0713  Max: 115/67   Max taken time: 11/10/20 0713  SpO2  Av %  Min: 99 %   Min taken time: 11/10/20 0713  Max: 99 %   Max taken time: 11/10/20 0713    BP Readings from Last 3 Encounters:   11/10/20 115/67   04/10/19 99/62   19 106/68     BMI  Body mass index is 24.24 kg/m².   Estimated body mass index is 24.24 kg/m² as calculated from the following:    Height as of this encounter: 5' 8\" (1.727 m). Weight as of this encounter: 159 lb 6.4 oz (72.3 kg). CBC   Lab Results   Component Value Date    WBC 6.1 01/08/2019    RBC 4.59 01/08/2019    HGB 14.5 01/08/2019    HCT 43.9 01/08/2019    MCV 95.6 01/08/2019    RDW 13.0 01/08/2019     01/08/2019     CMP    Lab Results   Component Value Date     08/12/2020    K 4.1 08/12/2020     08/12/2020    CO2 26 08/12/2020    BUN 13 08/12/2020    CREATININE 0.8 08/12/2020    GFRAA >60 08/12/2020    GFRAA >60 06/05/2012    AGRATIO 1.8 03/25/2019    LABGLOM >60 08/12/2020    GLUCOSE 100 08/12/2020    PROT 6.7 03/25/2019    PROT 6.8 06/05/2012    CALCIUM 9.4 08/12/2020    BILITOT 1.8 03/25/2019    ALKPHOS 63 03/25/2019    AST 20 03/25/2019    ALT 15 03/25/2019     BMP    Lab Results   Component Value Date     08/12/2020    K 4.1 08/12/2020     08/12/2020    CO2 26 08/12/2020    BUN 13 08/12/2020    CREATININE 0.8 08/12/2020    CALCIUM 9.4 08/12/2020    GFRAA >60 08/12/2020    GFRAA >60 06/05/2012    LABGLOM >60 08/12/2020    GLUCOSE 100 08/12/2020     POCGlucose  No results for input(s): GLUCOSE in the last 72 hours. Coags  No results found for: PROTIME, INR, APTT  HCG (If Applicable) No results found for: PREGTESTUR, PREGSERUM, HCG, HCGQUANT   ABGs No results found for: PHART, PO2ART, DAQ8PVL, DMN8QUR, BEART, I9PAAUSI   Type & Screen (If Applicable)  No results found for: LABABO, LABRH                         BMI: Wt Readings from Last 3 Encounters:       NPO Status:   Date of last liquid consumption: 11/10/20   Time of last liquid consumption: 0000   Date of last solid food consumption: 11/08/20      Time of last solid consumption: 1700       Anesthesia Evaluation  Patient summary reviewed   history of anesthetic complications: PONV.   Airway: Mallampati: III  TM distance: >3 FB   Neck ROM: full   Dental: normal exam         Pulmonary:normal exam                               Cardiovascular:  Exercise tolerance: good (>4 METS),         ECG reviewed  Rhythm: regular  Rate: normal                    Neuro/Psych:   (+) headaches:,             GI/Hepatic/Renal:             Endo/Other:    (+) hypothyroidism::., .                 Abdominal:           Vascular: negative vascular ROS. Anesthesia Plan      MAC     ASA 2       Induction: intravenous. Anesthetic plan and risks discussed with patient. Plan discussed with CRNA. This pre-anesthesia assessment may be used as a history and physical.    DOS STAFF ADDENDUM:    Pt seen and examined, chart reviewed (including anesthesia, drug and allergy history). No interval changes to history and physical examination. Anesthetic plan, risks, benefits, alternatives, and personnel involved discussed with patient. Questions and concerns addressed. Patient(family) verbalized an understanding and agrees to proceed.       Yasir Velazquez MD  November 10, 2020  7:22 AM

## 2020-12-10 ENCOUNTER — HOSPITAL ENCOUNTER (OUTPATIENT)
Dept: WOMENS IMAGING | Age: 70
Discharge: HOME | End: 2020-12-10
Payer: MEDICARE

## 2020-12-10 PROCEDURE — 77063 BREAST TOMOSYNTHESIS BI: CPT

## 2021-03-10 ENCOUNTER — OFFICE VISIT (OUTPATIENT)
Dept: DERMATOLOGY | Age: 71
End: 2021-03-10
Payer: MEDICARE

## 2021-03-10 VITALS — TEMPERATURE: 97.2 F

## 2021-03-10 DIAGNOSIS — L82.0 INFLAMED SEBORRHEIC KERATOSIS: ICD-10-CM

## 2021-03-10 DIAGNOSIS — Z85.828 HISTORY OF BASAL CELL CARCINOMA: ICD-10-CM

## 2021-03-10 DIAGNOSIS — L82.1 SK (SEBORRHEIC KERATOSIS): Primary | ICD-10-CM

## 2021-03-10 DIAGNOSIS — D22.9 MULTIPLE NEVI: ICD-10-CM

## 2021-03-10 PROCEDURE — 17110 DESTRUCTION B9 LES UP TO 14: CPT | Performed by: DERMATOLOGY

## 2021-03-10 PROCEDURE — G8399 PT W/DXA RESULTS DOCUMENT: HCPCS | Performed by: DERMATOLOGY

## 2021-03-10 PROCEDURE — 1036F TOBACCO NON-USER: CPT | Performed by: DERMATOLOGY

## 2021-03-10 PROCEDURE — G8420 CALC BMI NORM PARAMETERS: HCPCS | Performed by: DERMATOLOGY

## 2021-03-10 PROCEDURE — 3017F COLORECTAL CA SCREEN DOC REV: CPT | Performed by: DERMATOLOGY

## 2021-03-10 PROCEDURE — 1123F ACP DISCUSS/DSCN MKR DOCD: CPT | Performed by: DERMATOLOGY

## 2021-03-10 PROCEDURE — G8484 FLU IMMUNIZE NO ADMIN: HCPCS | Performed by: DERMATOLOGY

## 2021-03-10 PROCEDURE — 99213 OFFICE O/P EST LOW 20 MIN: CPT | Performed by: DERMATOLOGY

## 2021-03-10 PROCEDURE — 1090F PRES/ABSN URINE INCON ASSESS: CPT | Performed by: DERMATOLOGY

## 2021-03-10 PROCEDURE — 4040F PNEUMOC VAC/ADMIN/RCVD: CPT | Performed by: DERMATOLOGY

## 2021-03-10 PROCEDURE — G8427 DOCREV CUR MEDS BY ELIG CLIN: HCPCS | Performed by: DERMATOLOGY

## 2021-03-10 NOTE — PROGRESS NOTES
[facility] Dermatology  Christiane Hicks MD  [phone]      Joanna Hemphill  1950    78 y.o. female     Date of Visit: 3/10/2021    Chief Complaint: skin lesions    History of Present Illness:    1. She has multiple growths on the scalp and trunk. 2.  She complains of a persistent lesion on the left elbow. Recently bled and became irritated. She also complains of an irritated lesion on the central lower abdomen. 3.  She has multiple moles on the trunk and extremities-tolerating changes in size, color, shape. 4.  She has a history of multiple BCCs-denies any signs of recurrence. Superficial BCC on the right shin-treated with curettage on 10/2/2020. Nodular BCC of the right nasal ala-treated with Mohs by Dr. Ramiro Hdz on 10/3/2018. BCC of the nasal tip - treated with Mohs by [place], repair by [employer] in 2010. BCC on the forehead - treated with EDC in 1999.     Dysplastic nevus on the right upper back 2007. Review of Systems:  Gen: Feels well, good sense of health. Past Medical History, Family History, Surgical History, Medications and Allergies reviewed.     Past Medical History:   Diagnosis Date    Arthritis     Colon cancer ([address]     Narrow angle glaucoma suspect of both eyes 1/9/2017    PONV (postoperative nausea and vomiting)     Skin cancer     Thyroid disease      Past Surgical History:   Procedure Laterality Date    BREAST BIOPSY Left 2018    benign    COLECTOMY  2011    cancerous polyp    COLONOSCOPY  09/16/2015    COLONOSCOPY N/A 11/10/2020    COLONOSCOPY WITH BIOPSY performed by Juliana Lock [facility] at [facility] N/A 1/8/2019    benign- HYSTEROSCOPY DILATATION AND CURETTAGE performed by Monse Sanchez MD at [facility]  10/03/2018    R ala BCC    SKIN CANCER EXCISION      THYROID SURGERY  6/2011    BX        Allergies   Allergen Reactions    Erythromycin Shortness Of Breath    Adhesive Tape Rash     bandaids    Neosporin [Neomycin-Polymyxin-Gramicidin] Itching and Swelling     Any medication that ends in \"sporins\" - high sensitivity per PT      Outpatient Medications Marked as Taking for the 3/10/21 encounter (Office Visit) with Tarik Adkins MD   Medication Sig Dispense Refill    melatonin 3 MG TABS tablet Take 10 mg by mouth nightly as needed      ibuprofen (ADVIL;MOTRIN) 200 MG tablet Take 200 mg by mouth every 6 hours as needed for Pain      magnesium 30 MG tablet Take 250 mg by mouth daily      Multiple Vitamin (MULTIVITAMIN PO) Take  by mouth daily.  FIBER PO Take  by mouth daily.  Calcium Carbonate-Vit D-Min (CALCIUM 1200 PO) Take  by mouth daily. Physical Examination       The following were examined and determined to be normal: Psych/Neuro, Scalp/hair, Head/face, Conjunctivae/eyelids, Gums/teeth/lips, Neck, Breast/axilla/chest, Back, RUE, RLE, LLE and Nails/digits. The following were examined and determined to be abnormal: Abdomen and LUE. Well-appearing. 1.  Scalp, axillary region, trunk and to lesser extent the extremities are multiple stuck on appearing verrucous brown papules and plaques. 2.  Left elbow and suprapubic region with 2 crusted verrucous erythematous brown papules. 3.  Trunk and extremities with multiple well-defined round oval uniformly brown macules and few papules. Left earlobe with a round smooth skin colored and focally blue soft papule. 4.  Clear. Assessment and Plan     1. SK (seborrheic keratosis) - numerous    Reassurance. 2. Inflamed seborrheic keratosis     2 cycles of liquid nitrogen applied to 2 ISKs on the left elbow and lower abdomen. Patient was educated regarding the potential risks of blister formation, discomfort, hypopigmentation, and scar. Wound care was discussed.       3. Multiple nevi - benign appearing    Sun protective behaviors, including use of at least SPF 30 sunscreen, and self skin examinations were encouraged. Call for any new or concerning lesions. 4. History of basal cell carcinomas - clear    Sun protective behaviors, including use of at least SPF 30 sunscreen, and self skin examinations were encouraged. Call for any new or concerning lesions. Return in about 6 months (around 9/10/2021).     --Ryan Herron MD

## 2021-08-17 ENCOUNTER — OFFICE VISIT (OUTPATIENT)
Dept: FAMILY MEDICINE CLINIC | Age: 71
End: 2021-08-17
Payer: MEDICARE

## 2021-08-17 VITALS
HEIGHT: 68 IN | HEART RATE: 69 BPM | RESPIRATION RATE: 14 BRPM | DIASTOLIC BLOOD PRESSURE: 72 MMHG | TEMPERATURE: 98.3 F | SYSTOLIC BLOOD PRESSURE: 126 MMHG | OXYGEN SATURATION: 97 % | BODY MASS INDEX: 23.19 KG/M2 | WEIGHT: 153 LBS

## 2021-08-17 DIAGNOSIS — N95.0 POST-MENOPAUSAL BLEEDING: ICD-10-CM

## 2021-08-17 DIAGNOSIS — Z01.818 PREOP EXAMINATION: Primary | ICD-10-CM

## 2021-08-17 PROCEDURE — 99214 OFFICE O/P EST MOD 30 MIN: CPT | Performed by: NURSE PRACTITIONER

## 2021-08-17 PROCEDURE — G8420 CALC BMI NORM PARAMETERS: HCPCS | Performed by: NURSE PRACTITIONER

## 2021-08-17 PROCEDURE — 1036F TOBACCO NON-USER: CPT | Performed by: NURSE PRACTITIONER

## 2021-08-17 PROCEDURE — 1123F ACP DISCUSS/DSCN MKR DOCD: CPT | Performed by: NURSE PRACTITIONER

## 2021-08-17 PROCEDURE — 4040F PNEUMOC VAC/ADMIN/RCVD: CPT | Performed by: NURSE PRACTITIONER

## 2021-08-17 PROCEDURE — G8427 DOCREV CUR MEDS BY ELIG CLIN: HCPCS | Performed by: NURSE PRACTITIONER

## 2021-08-17 PROCEDURE — 1090F PRES/ABSN URINE INCON ASSESS: CPT | Performed by: NURSE PRACTITIONER

## 2021-08-17 PROCEDURE — 93000 ELECTROCARDIOGRAM COMPLETE: CPT | Performed by: NURSE PRACTITIONER

## 2021-08-17 PROCEDURE — 3017F COLORECTAL CA SCREEN DOC REV: CPT | Performed by: NURSE PRACTITIONER

## 2021-08-17 PROCEDURE — G8399 PT W/DXA RESULTS DOCUMENT: HCPCS | Performed by: NURSE PRACTITIONER

## 2021-08-17 ASSESSMENT — PATIENT HEALTH QUESTIONNAIRE - PHQ9
SUM OF ALL RESPONSES TO PHQ QUESTIONS 1-9: 0
SUM OF ALL RESPONSES TO PHQ QUESTIONS 1-9: 0
SUM OF ALL RESPONSES TO PHQ9 QUESTIONS 1 & 2: 0
2. FEELING DOWN, DEPRESSED OR HOPELESS: 0
SUM OF ALL RESPONSES TO PHQ QUESTIONS 1-9: 0
1. LITTLE INTEREST OR PLEASURE IN DOING THINGS: 0

## 2021-08-17 NOTE — PROGRESS NOTES
Preoperative Consultation      Arianna Patel  YOB: 1950    Date of Service:  8/17/2021    Vitals:    08/17/21 0810   BP: 126/72   Site: Right Upper Arm   Position: Sitting   Cuff Size: Medium Adult   Pulse: 69   Resp: 14   Temp: 98.3 °F (36.8 °C)   TempSrc: Oral   SpO2: 97%   Weight: 153 lb (69.4 kg)   Height: 5' 8\" (1.727 m)      Wt Readings from Last 2 Encounters:   08/17/21 153 lb (69.4 kg)   11/10/20 159 lb 6.4 oz (72.3 kg)     BP Readings from Last 3 Encounters:   08/17/21 126/72   11/10/20 99/63   11/10/20 (!) 105/57        Chief Complaint   Patient presents with    Pre-op Exam     8/24 hystoscopy, polyp removal. dr chayito Sanchez. Allergies   Allergen Reactions    Erythromycin Shortness Of Breath    Adhesive Tape Rash     bandaids    Neosporin [Neomycin-Polymyxin-Gramicidin] Itching and Swelling     Any medication that ends in \"sporins\" - high sensitivity per PT      Outpatient Medications Marked as Taking for the 8/17/21 encounter (Office Visit) with MABEL Bragg - CNP   Medication Sig Dispense Refill    melatonin 3 MG TABS tablet Take 10 mg by mouth nightly as needed      ibuprofen (ADVIL;MOTRIN) 200 MG tablet Take 200 mg by mouth every 6 hours as needed for Pain      magnesium 30 MG tablet Take 250 mg by mouth daily      Multiple Vitamin (MULTIVITAMIN PO) Take  by mouth daily.  FIBER PO Take  by mouth daily.  Calcium Carbonate-Vit D-Min (CALCIUM 1200 PO) Take  by mouth daily. This patient presents to the office today for a preoperative consultation at the request of surgeon, Dr. Mika Hdez, who plans on performing hysteroscopy, polyp removal on August 24, 2021 at [address] [place] [address]. Patient reports that she had postmenopausal bleeding after intercourse in early July. She reports that she had a biopsy performed in Dr. Dinesh Al office and u/s completed. These results are not currently available to me.  She reports that she has had bleeding after the biopsy for about a week, but denies any other bleeding. Denies abd pain, bloating, diarrhea, constipation, urinary issues. Takes ibuprofen a couple of times per week for aches and pains.      Planned anesthesia: General   Known anesthesia problems: Past general anesthesia with complications- post-op nausea and vomiting    Bleeding risk: No recent or remote history of abnormal bleeding  Personal or FH of DVT/PE: No    Patient objection to receiving blood products: No    Patient Active Problem List   Diagnosis    Multinodular goiter    Osteopenia of multiple sites    History of colon cancer- 2002, resected    Episodic tension-type headache, not intractable    Narrow angle glaucoma suspect of both eyes    Primary osteoarthritis of both knees    History of basal cell carcinoma    Atypical glandular cells of undetermined significance (ANAYELI) on cervical Pap smear 11/18 per GYN       Past Medical History:   Diagnosis Date    Arthritis     Colon cancer ([address]     Narrow angle glaucoma suspect of both eyes 1/9/2017    PONV (postoperative nausea and vomiting)     Skin cancer     Thyroid disease      Past Surgical History:   Procedure Laterality Date    BREAST BIOPSY Left 2018    benign    COLECTOMY  2011    cancerous polyp    COLONOSCOPY  09/16/2015    COLONOSCOPY N/A 11/10/2020    COLONOSCOPY WITH BIOPSY performed by Mariano Ferrari MD at [facility] N/A 1/8/2019    benign- HYSTEROSCOPY DILATATION AND CURETTAGE performed by Daisy Monteiro MD at [place] [address]  10/03/2018    R ala BCC    SKIN CANCER EXCISION      THYROID SURGERY  6/2011    BX      Family History   Problem Relation Age of Onset    Cancer Father         ethel. melanoma     Heart Disease Maternal Grandfather     Osteoporosis Mother     Diabetes Neg Hx     High Cholesterol Neg Hx     Hypertension Neg Hx     Stroke Neg Hx     Thyroid Disease Neg Hx     Seizures Neg Hx Social History     Socioeconomic History    Marital status:      Spouse name: Not on file    Number of children: Not on file    Years of education: Not on file    Highest education level: Not on file   Occupational History    Not on file   Tobacco Use    Smoking status: Never Smoker    Smokeless tobacco: Never Used    Tobacco comment: advised not to start   Vaping Use    Vaping Use: Never used   Substance and Sexual Activity    Alcohol use: Yes     Alcohol/week: 2.0 standard drinks     Types: 1 Standard drinks or equivalent, 1 Glasses of wine per week    Drug use: No    Sexual activity: Yes   Other Topics Concern    Not on file   Social History Narrative    Exercise: walking daily. Lives with spouse. Seatbelt use: Always. Living will:  no,   additional information provided. Review of Systems  A comprehensive review of systems was negative except for what was noted in the HPI. Physical Exam   Constitutional: She is oriented to person, place, and time. She appears well-developed and well-nourished. No distress. HENT:   Head: Normocephalic and atraumatic. Mouth/Throat: Uvula is midline, oropharynx is clear and moist and mucous membranes are normal.   Eyes: Conjunctivae and EOM are normal. Pupils are equal, round, and reactive to light. Neck: Trachea normal and normal range of motion. Neck supple. No JVD present. Carotid bruit is not present. Cardiovascular: Normal rate, regular rhythm, normal heart sounds and intact distal pulses. Exam reveals no gallop and no friction rub. No murmur heard. Pulmonary/Chest: Effort normal and breath sounds normal. No respiratory distress. She has no wheezes. She has no rales. Abdominal: Soft. No tenderness. Musculoskeletal: She exhibits no edema and no tenderness. Neurological: She is alert and oriented to person, place, and time. She has normal strength. No cranial nerve deficit or sensory deficit.  Coordination and gait normal. Skin: Skin is warm and dry. No rash noted. No erythema. Psychiatric: She has a normal mood and affect. Her behavior is normal.     EKG Interpretation:  normal sinus rhythm, no significant changes from previous tracing (12/26/18)    Lab Review   Lab Results   Component Value Date     08/12/2020    K 4.1 08/12/2020     08/12/2020    CO2 26 08/12/2020    BUN 13 08/12/2020    CREATININE 0.8 08/12/2020    GLUCOSE 100 08/12/2020    CALCIUM 9.4 08/12/2020     Lab Results   Component Value Date    WBC 6.1 01/08/2019    HGB 14.5 01/08/2019    HCT 43.9 01/08/2019    MCV 95.6 01/08/2019     01/08/2019           Assessment:       78 y.o. patient with planned surgery as above. Known risk factors for perioperative complications: None  Current medications which may produce withdrawal symptoms if withheld perioperatively: none      Plan:     1. Preoperative workup as follows: ECG, hemoglobin, hematocrit, electrolytes  2. Change in medication regimen before surgery: Discontinue ASA and MVI 7 days before surgery  3. Prophylaxis for cardiac events with perioperative beta-blockers: Not indicated  ACC/AHA indications for pre-operative beta-blocker use:    · Vascular surgery with history of postitive stress test  · Intermediate or high risk surgery with history of CAD   · Intermediate or high risk surgery with multiple clinical predictors of CAD- 2 of the following: history of compensated or prior heart failure, history of cerebrovascular disease, DM, or renal insufficiency    Routine administration of higher-dose, long-acting metoprolol in beta-blocker-naïve patients on the day of surgery, and in the absence of dose titration is associated with an overall increase in mortality. Beta-blockers should be started days to weeks prior to surgery and titrated to pulse < 70.  4. Deep vein thrombosis prophylaxis: regimen to be chosen by surgical team  5.  No contraindications to planned surgery of hysteroscopy, polyp removal with Dr. Terry Carrillo on 8/24/21.

## 2021-09-15 ENCOUNTER — OFFICE VISIT (OUTPATIENT)
Dept: DERMATOLOGY | Age: 71
End: 2021-09-15
Payer: MEDICARE

## 2021-09-15 VITALS — TEMPERATURE: 97.7 F

## 2021-09-15 DIAGNOSIS — L82.1 SK (SEBORRHEIC KERATOSIS): Primary | ICD-10-CM

## 2021-09-15 DIAGNOSIS — D22.9 MULTIPLE NEVI: ICD-10-CM

## 2021-09-15 DIAGNOSIS — Z85.828 HISTORY OF BASAL CELL CARCINOMA: ICD-10-CM

## 2021-09-15 PROCEDURE — G8427 DOCREV CUR MEDS BY ELIG CLIN: HCPCS | Performed by: DERMATOLOGY

## 2021-09-15 PROCEDURE — 4040F PNEUMOC VAC/ADMIN/RCVD: CPT | Performed by: DERMATOLOGY

## 2021-09-15 PROCEDURE — 1036F TOBACCO NON-USER: CPT | Performed by: DERMATOLOGY

## 2021-09-15 PROCEDURE — G8420 CALC BMI NORM PARAMETERS: HCPCS | Performed by: DERMATOLOGY

## 2021-09-15 PROCEDURE — 1123F ACP DISCUSS/DSCN MKR DOCD: CPT | Performed by: DERMATOLOGY

## 2021-09-15 PROCEDURE — 3017F COLORECTAL CA SCREEN DOC REV: CPT | Performed by: DERMATOLOGY

## 2021-09-15 PROCEDURE — 99213 OFFICE O/P EST LOW 20 MIN: CPT | Performed by: DERMATOLOGY

## 2021-09-15 PROCEDURE — 1090F PRES/ABSN URINE INCON ASSESS: CPT | Performed by: DERMATOLOGY

## 2021-09-15 PROCEDURE — G8399 PT W/DXA RESULTS DOCUMENT: HCPCS | Performed by: DERMATOLOGY

## 2021-09-15 NOTE — PROGRESS NOTES
[facility]  Yonny Hammond MD  [phone]      Jakie Kawasaki  1950    78 y.o. female     Date of Visit: 9/15/2021    Chief Complaint: skin lesions    History of Present Illness:    1. She presents today for multiple persistent lesions on the scalp, trunk and extremities. None are bothersome or irritated. 2.  She also has multiple moles on the trunk and lower extremities. Not aware of any changes in size, color, or shape. 3.  Has hx of BCCs - denies any signs of recurrence.      Superficial BCC on the right shin-treated with curettage on 10/2/2020. Nodular BCC of the right nasal ala-treated with Mohs by Dr. Yuliana Rowe on 10/3/2018. BCC of the nasal tip - treated with Mohs by [place], repair by [place] in 2010. BCC on the forehead - treated with EDC in 1999.     Dysplastic nevus on the right upper back 2007. Review of Systems:  Gen: Feels well, good sense of health. Past Medical History, Family History, Surgical History, Medications and Allergies reviewed.     Past Medical History:   Diagnosis Date    Arthritis     Colon cancer ([address]     Narrow angle glaucoma suspect of both eyes 1/9/2017    PONV (postoperative nausea and vomiting)     Skin cancer     Thyroid disease      Past Surgical History:   Procedure Laterality Date    BREAST BIOPSY Left 2018    benign    COLECTOMY  2011    cancerous polyp    COLONOSCOPY  09/16/2015    COLONOSCOPY N/A 11/10/2020    COLONOSCOPY WITH BIOPSY performed by Rob Rodriguez MD at [facility] 71 N/A 1/8/2019    benign- HYSTEROSCOPY DILATATION AND CURETTAGE performed by Racheal Barney MD at [facility]  10/03/2018    R ala BCC    SKIN CANCER EXCISION      THYROID SURGERY  6/2011    BX        Allergies   Allergen Reactions    Erythromycin Shortness Of Breath    Adhesive Tape Rash     bandaids    Neosporin [Neomycin-Polymyxin-Gramicidin] Itching and Swelling Any medication that ends in \"sporins\" - high sensitivity per PT      Outpatient Medications Marked as Taking for the 9/15/21 encounter (Office Visit) with Александр Ordoñez MD   Medication Sig Dispense Refill    melatonin 3 MG TABS tablet Take 10 mg by mouth nightly as needed      ibuprofen (ADVIL;MOTRIN) 200 MG tablet Take 200 mg by mouth every 6 hours as needed for Pain      magnesium 30 MG tablet Take 250 mg by mouth daily      Multiple Vitamin (MULTIVITAMIN PO) Take  by mouth daily.  FIBER PO Take  by mouth daily.  Calcium Carbonate-Vit D-Min (CALCIUM 1200 PO) Take  by mouth daily. Physical Examination       The following were examined and determined to be normal: Psych/Neuro, Scalp/hair, Head/face, Conjunctivae/eyelids, Gums/teeth/lips, Neck, Breast/axilla/chest, Abdomen, Back, RUE, LUE, RLE, LLE and Nails/digits. The following were examined and determined to be abnormal: None. Well-appearing. 1.  Scattered on the scalp, neck, axillary regions, trunk are numerous stuck on appearing waxy and verrucous brown papules and plaques. 2.  Trunk with several scattered round smooth soft pink papules. Face with few small round pink papules. Lower extremities with scattered round smooth brown macules. 3.  Clear. Assessment and Plan     1. SK (seborrheic keratosis) - numerous    Reassurance. 2. Multiple nevi - benign appearing    Sun protective behaviors, including use of at least SPF 30 sunscreen, and self skin examinations were encouraged. Call for any new or concerning lesions. 3. History of basal cell carcinomas - clear    Sun protective behaviors, including use of at least SPF 30 sunscreen, and self skin examinations were encouraged. Call for any new or concerning lesions. Return in about 6 months (around 3/15/2022).     --Александр Ordoñez MD

## 2021-10-01 ENCOUNTER — TELEPHONE (OUTPATIENT)
Dept: FAMILY MEDICINE CLINIC | Age: 71
End: 2021-10-01

## 2021-10-01 NOTE — TELEPHONE ENCOUNTER
----- Message from Jennifer Manuel sent at 10/1/2021 11:39 AM EDT -----  Subject: Appointment Request    Reason for Call: Routine Medicare AWV    QUESTIONS  Type of Appointment? Established Patient  Reason for appointment request? No appointments available during search  Additional Information for Provider? Patient called to reschedule her AWV   appointment from 10/4/2021 with Dr. Nila Londono. No available appointments were   found to reschedule. Patient does not have a scheduling preference. ---------------------------------------------------------------------------  --------------  Josue RODRIGUEZ  What is the best way for the office to contact you? OK to leave message on   voicemail  Preferred Call Back Phone Number? [phone]  ---------------------------------------------------------------------------  --------------  SCRIPT ANSWERS  Relationship to Patient? Self  Have your symptoms changed? No  (If the patient has Medicare as their primary insurance coverage ask this   question) Are you requesting a Medicare Annual Wellness Visit? Yes   (Is the patient requesting a pap smear with their physical exam?)? No  (Is the patient requesting their annual physical and does not need PAP or   AWV per above?)? No  Have you been diagnosed with, awaiting test results for, or told that you   are suspected of having COVID-19 (Coronavirus)? (If patient has tested   negative or was tested as a requirement for work, school, or travel and   not based on symptoms, answer no)? No  Within the past two weeks have you developed any of the following symptoms   (answer no if symptoms have been present longer than 2 weeks or began   more than 2 weeks ago)? Fever or Chills, Cough, Shortness of breath or   difficulty breathing, Loss of taste or smell, Sore throat, Nasal   congestion, Sneezing or runny nose, Fatigue or generalized body aches   (answer no if pain is specific to a body part e.g. back pain), Diarrhea,   Headache?  No  Have you had close contact with someone with COVID-19 in the last 14 days? No  (Service Expert  click yes below to proceed with [employer] As Usual   Scheduling)?  Yes

## 2021-10-12 ENCOUNTER — OFFICE VISIT (OUTPATIENT)
Dept: FAMILY MEDICINE CLINIC | Age: 71
End: 2021-10-12
Payer: MEDICARE

## 2021-10-12 VITALS
RESPIRATION RATE: 16 BRPM | OXYGEN SATURATION: 98 % | HEART RATE: 74 BPM | BODY MASS INDEX: 23.19 KG/M2 | WEIGHT: 153 LBS | HEIGHT: 68 IN | SYSTOLIC BLOOD PRESSURE: 116 MMHG | DIASTOLIC BLOOD PRESSURE: 72 MMHG

## 2021-10-12 DIAGNOSIS — M79.604 LEG PAIN, RIGHT: ICD-10-CM

## 2021-10-12 DIAGNOSIS — Z00.00 ROUTINE GENERAL MEDICAL EXAMINATION AT A HEALTH CARE FACILITY: Primary | ICD-10-CM

## 2021-10-12 PROBLEM — R87.619 ATYPICAL GLANDULAR CELLS OF UNDETERMINED SIGNIFICANCE (AGUS) ON CERVICAL PAP SMEAR: Status: RESOLVED | Noted: 2019-04-17 | Resolved: 2021-10-12

## 2021-10-12 PROCEDURE — G0439 PPPS, SUBSEQ VISIT: HCPCS | Performed by: FAMILY MEDICINE

## 2021-10-12 PROCEDURE — G8484 FLU IMMUNIZE NO ADMIN: HCPCS | Performed by: FAMILY MEDICINE

## 2021-10-12 PROCEDURE — 1123F ACP DISCUSS/DSCN MKR DOCD: CPT | Performed by: FAMILY MEDICINE

## 2021-10-12 PROCEDURE — 3017F COLORECTAL CA SCREEN DOC REV: CPT | Performed by: FAMILY MEDICINE

## 2021-10-12 PROCEDURE — 4040F PNEUMOC VAC/ADMIN/RCVD: CPT | Performed by: FAMILY MEDICINE

## 2021-10-12 ASSESSMENT — LIFESTYLE VARIABLES
HOW OFTEN DO YOU HAVE A DRINK CONTAINING ALCOHOL: 2
HOW OFTEN DURING THE LAST YEAR HAVE YOU HAD A FEELING OF GUILT OR REMORSE AFTER DRINKING: 0
HOW OFTEN DO YOU HAVE SIX OR MORE DRINKS ON ONE OCCASION: 0
AUDIT TOTAL SCORE: 2
HAS A RELATIVE, FRIEND, DOCTOR, OR ANOTHER HEALTH PROFESSIONAL EXPRESSED CONCERN ABOUT YOUR DRINKING OR SUGGESTED YOU CUT DOWN: 0
HOW OFTEN DURING THE LAST YEAR HAVE YOU FOUND THAT YOU WERE NOT ABLE TO STOP DRINKING ONCE YOU HAD STARTED: 0
HAVE YOU OR SOMEONE ELSE BEEN INJURED AS A RESULT OF YOUR DRINKING: 0
HOW OFTEN DURING THE LAST YEAR HAVE YOU FAILED TO DO WHAT WAS NORMALLY EXPECTED FROM YOU BECAUSE OF DRINKING: 0
HOW OFTEN DURING THE LAST YEAR HAVE YOU NEEDED AN ALCOHOLIC DRINK FIRST THING IN THE MORNING TO GET YOURSELF GOING AFTER A NIGHT OF HEAVY DRINKING: 0
HOW MANY STANDARD DRINKS CONTAINING ALCOHOL DO YOU HAVE ON A TYPICAL DAY: 0
AUDIT-C TOTAL SCORE: 2
HOW OFTEN DURING THE LAST YEAR HAVE YOU BEEN UNABLE TO REMEMBER WHAT HAPPENED THE NIGHT BEFORE BECAUSE YOU HAD BEEN DRINKING: 0

## 2021-10-12 ASSESSMENT — PATIENT HEALTH QUESTIONNAIRE - PHQ9
SUM OF ALL RESPONSES TO PHQ9 QUESTIONS 1 & 2: 0
1. LITTLE INTEREST OR PLEASURE IN DOING THINGS: 0
SUM OF ALL RESPONSES TO PHQ QUESTIONS 1-9: 0
2. FEELING DOWN, DEPRESSED OR HOPELESS: 0

## 2021-10-12 NOTE — PROGRESS NOTES
Medicare Annual Wellness Visit  Name: Jalyn Nelson  YOB: 1950  Age: 78 y.o. Sex: female  MRN: [id number]     Date of Service:  10/12/2021    Chief Complaint:   Jalyn Nelson is a 78 y.o. female who presents for Medicare Annual Wellness Visit   1. Routine general medical examination at a health care facility    2. Leg pain, right      HPI    Chief Complaint   Patient presents with    Medicare AWV   Complaints: pt keeps getting a cramp in her right calf at night. It comes and goes but worse past month. Can begin when at rest (not asleep). No change in physical activity. R lower leg and, lat and medial. Can walk it off. Lasts a few days. Start after a long drive. Rub helps. Ibu helps. No swelling. · Last colon 20220  Review of Systems   Respiratory ROS: cough? No   Wheezing? No  Cardiovascular ROS: chest pain? No   Shortness of breath? No  Gastrointestinal ROS: abdominal pain? No   Change in stools? No    Health Maintenance Due   Topic Date Due    Shingles Vaccine (2 of 3) 04/26/2013    DEXA (modify frequency per FRAX score)  11/22/2020    Annual Wellness Visit (AWV)  08/08/2021     *Chief complaint, HPI, History and ROS provided by the medical assistant has been reviewed and verified by provider- Genesis Dickens MD    CHART REVIEW   reports that she has never smoked.  She has never used smokeless tobacco.  Current Outpatient Medications   Medication Instructions    Calcium Carbonate-Vit D-Min (CALCIUM 1200 PO) DAILY    FIBER PO DAILY    ibuprofen (ADVIL;MOTRIN) 200 mg, Oral, EVERY 6 HOURS PRN    magnesium 250 mg, Oral, DAILY    melatonin 10 mg, Oral, NIGHTLY PRN    Multiple Vitamin (MULTIVITAMIN PO) DAILY     The 10-year ASCVD risk score (Zackary Robles, et al., 2013) is: 5.6%    Values used to calculate the score:      Age: 78 years      Sex: Female      Is Non- : No      Diabetic: No      Tobacco smoker: No      Systolic Blood Pressure: 565 mmHg Is BP treated: No      HDL Cholesterol: 62 mg/dL      Total Cholesterol: 157 mg/dL  Family History   Problem Relation Age of Onset    Osteoporosis Mother     Cancer Father         malkristin. melanoma     Heart Disease Maternal Grandfather     Breast Cancer Natural Child     Diabetes Neg Hx     High Cholesterol Neg Hx     Hypertension Neg Hx     Stroke Neg Hx     Thyroid Disease Neg Hx     Seizures Neg Hx      Social History     Tobacco Use    Smoking status: Never Smoker    Smokeless tobacco: Never Used    Tobacco comment: advised not to start   Vaping Use    Vaping Use: Never used   Substance Use Topics    Alcohol use:  Yes     Alcohol/week: 2.0 standard drinks     Types: 1 Standard drinks or equivalent, 1 Glasses of wine per week    Drug use: No      Immunization History   Administered Date(s) Administered    COVID-19, Moderna, PF, 100mcg/0.5mL 02/09/2021, 03/09/2021    Influenza A ([id number]) Vaccine PF IM 12/15/2009    Influenza Vaccine, unspecified formulation 10/01/2014, 10/12/2016    Influenza Virus Vaccine 10/12/2016, 09/30/2020, 10/04/2021    Influenza, High Dose (Fluzone 65 yrs and older) 10/11/2018, 10/11/2018    Influenza, Intradermal, Quadrivalent, Preservative Free 10/09/2017    Influenza, Quadv, Recombinant, IM PF (Flublok 18 yrs and older) 10/16/2019    Pneumococcal Conjugate 13-valent ([id number]) 12/26/2018    Tdap (Boostrix, Adacel) 02/17/2012, 02/17/2012    Zoster Live (Zostavax) 03/01/2013, 03/01/2013     LAST LABS  Cholesterol, Total   Date Value Ref Range Status   03/25/2019 157 0 - 199 mg/dL Final     LDL Calculated   Date Value Ref Range Status   03/25/2019 85 <100 mg/dL Final     HDL   Date Value Ref Range Status   03/25/2019 62 (H) 40 - 60 mg/dL Final   06/05/2012 62 (H) 40 - 60 mg/dl Final     Triglycerides   Date Value Ref Range Status   03/25/2019 50 0 - 150 mg/dL Final     Lab Results   Component Value Date    GLUCOSE 80 08/17/2021     Lab Results   Component Value Date     08/17/2021    K 4.6 08/17/2021    CREATININE 0.6 08/17/2021     Lab Results   Component Value Date    WBC 6.1 08/17/2021    HGB 14.6 08/17/2021    HCT 42.1 08/17/2021    MCV 93.3 08/17/2021     08/17/2021     Lab Results   Component Value Date    ALT 14 08/17/2021    AST 20 08/17/2021    ALKPHOS 61 08/17/2021    BILITOT 1.3 (H) 08/17/2021     TSH (uIU/mL)   Date Value   03/25/2019 0.64     No results found for: LABA1C     CareTeam (Including outside providers/suppliers regularly involved in providing care):   Patient Care Team:  Brad Garrett MD as PCP - Kenna Alcantara MD as PCP - [facility]  Joleen France MD as Consulting Physician (Endocrinology)    The following problems were reviewed today and where indicated follow up appointments were made and/or referrals ordered. Positive Risk Factor Screenings with Interventions:      No Positive Risk Factors identified today. Current Health Maintenance Status  Recommendations for Preventive Services Due: see orders. Recommended screening schedule for the next 5-10 years is provided to the patient in written form: see Patient Instructions/AVS.    PHYSICAL EXAM:  VITALS:  /72 (Site: Left Upper Arm, Position: Sitting, Cuff Size: Medium Adult)   Pulse 74   Resp 16   Ht 5' 8\" (1.727 m)   Wt 153 lb (69.4 kg)   SpO2 98%   BMI 23.26 kg/m²   BP Readings from Last 5 Encounters:   10/12/21 116/72   08/17/21 126/72   11/10/20 99/63   11/10/20 (!) 105/57   04/10/19 99/62     Wt Readings from Last 5 Encounters:   10/12/21 153 lb (69.4 kg)   08/17/21 153 lb (69.4 kg)   11/10/20 159 lb 6.4 oz (72.3 kg)   08/07/20 151 lb (68.5 kg)   04/10/19 153 lb (69.4 kg)   Body mass index is 23.26 kg/m².   GENERAL: well-developed, well-nourished, alert, no distress, calm   EYES: negative findings: lids and lashes normal and conjunctivae and sclerae normal  ENT: normal TM's and external ear canals both ears  · External nose and ears appear normal  · Pharynx: normal. Exudates: None  · Lips, mucosa, and tongue normal  · Hearing grossly normal.    NECK: No adenopathy, supple, symmetrical, trachea midline  · Thyroid not enlarged, symmetric, no tenderness/mass/nodules  · no cervical nodes, no supraclavicular nodes  LUNGS:  Breathing unlabored  · clear to auscultation bilaterally and good air movement  CARDIOVASC: regular rate and rhythm, S1, S2 normal   LEGS:  Lower extremity edema: none  Neg Tracey's, no calf tenderness   No carotid bruits  ABDOMEN: Soft, non-tender, no masses  · No hepatosplenomegaly  · No hernias noted. Exam limited by N/A  SKIN: warm and dry  · No rashes or suspicious lesions  PSYCH:  Alert and oriented  · Normal reasoning, insight good  · Facial expressions full, mood appropriate  · No memory disturbance noted  MUSCULOSKEL:  No significant finger or nail findings  · Spine symmetric, no deformities, kyposis present, moderate   GAIT: UP and Go test: <30 seconds with gait: normal.  Speed Normal.  No significant balance checks. No extra steps on turn around. Assistive device: none      Assessment and Plan:      Diagnosis Orders   1. Routine general medical examination at a health care facility     2. Leg pain, right     Stable     Continue current Tx plan. Any changes marked below. INSTRUCTIONS  NEXT APPOINTMENT: Please schedule annual complete physical (30 minutes) in one year. · PLEASE TAKE THIS FORM TO CHECK-OUT WINDOW TO SCHEDULE NEXT VISIT. · Get annual derm exams. · To rehab leg, do stretched three times a day. · Take ibuprofen 200 mg three times per day for a week. · Medicare part D patients:  Get Shingrix shingles vaccine at pharmacy (such as [facility] or [facility]). Need second dose in 2-6 months. · Get DEXA later this month as planned.

## 2021-10-12 NOTE — PATIENT INSTRUCTIONS
INSTRUCTIONS  NEXT APPOINTMENT: Please schedule annual complete physical (30 minutes) in one year. · PLEASE TAKE THIS FORM TO CHECK-OUT WINDOW TO SCHEDULE NEXT VISIT. · Get annual derm exams. · To rehab leg, do stretched three times a day. · Take ibuprofen 200 mg three times per day for a week. · Medicare part D patients:  Get Shingrix shingles vaccine at pharmacy (such as [facility] or [facility]). Need second dose in 2-6 months. · Get DEXA later this month as planned. Patient Education         Calf Strain Rehabilitation Exercises     You can begin gently stretching your calf muscle using the towel stretch right away. Make sure you only get a gentle pull and not a sharp pain while you are doing this stretch. Towel stretch: Sit on a hard surface with your injured leg stretched out in front of you. Loop a towel around the ball of your foot and pull the towel toward your body keeping your knee straight. Hold this position for 15 to 30 seconds then relax. Repeat 3 times. After you can do the towel stretch easily, you can start the standing calf stretch. Standing calf stretch: Facing a wall, put your hands against the wall at about eye level. Keep the injured leg back, the uninjured leg forward, and the heel of your injured leg on the floor. Turn your injured foot slightly inward (as if you were pigeon-toed) as you slowly lean into the wall until you feel a stretch in the back of your calf. Hold for 15 to 30 seconds. Repeat 3 times. Do this exercise several times each day. After a couple days of stretching, you can begin strengthening your calf and lower leg muscles using elastic tubing as described in the next 2 exercises. Resisted dorsiflexion: Sit with your injured leg out straight and your foot facing a doorway. Tie a loop in one end of the tubing. Put your foot through the loop so that the tubing goes around the arch of your foot.  Tie a knot in the other end of the tubing and shut the knot in the door. Move backward until there is tension in the tubing. Keeping your knee straight, pull your foot toward your body, stretching the tubing. Slowly return to the starting position. Do 3 sets of 10. Resisted plantar flexion: Sit with your leg outstretched and loop the middle section of the tubing around the ball of your foot. Hold the ends of the tubing in both hands. Gently press the ball of your foot down and point your toes, stretching the tubing. Return to the starting position. Do 3 sets of 10. You may do the last 3 exercises when you can stand on your toes without pain. Heel raises: Balance yourself while standing behind a chair or counter. Raise your body up onto your toes and hold it for 5 seconds, then slowly lower yourself down. Repeat 10 times. Do 3 sets of 10. You can challenge yourself by standing only on your injured leg and lifting your heel off the ground. Single-leg balance: Stand without any support and attempt to balance on your injured leg. Begin with your eyes open and then try to perform the exercise with your eyes closed. Hold the single-leg position for 30 seconds. Repeat 3 times. When you have mastered this, try doing this exercise standing on a pillow. Wall jump: Face a wall and place a piece of masking tape about 2 feet above your head. Jump up with your arms above your head and try to touch the piece of tape. Make sure you do a \"spring\" type of motion and do not land hard onto your feet. Progress to taking off and landing on one foot. Do 3 sets of 10. Another good exercise is hopping. You can start at one end of the room and try to hop as high as you can across the room on one foot. Jumping rope is also a good exercise. Personalized Preventive Plan for [employer] - 10/12/2021  Medicare offers a range of preventive health benefits.  Some of the tests and screenings are paid in full while other may be subject to a deductible, co-insurance, and/or copay.    Some of these benefits include a comprehensive review of your medical history including lifestyle, illnesses that may run in your family, and various assessments and screenings as appropriate. After reviewing your medical record and screening and assessments performed today your provider may have ordered immunizations, labs, imaging, and/or referrals for you. A list of these orders (if applicable) as well as your Preventive Care list are included within your After Visit Summary for your review. Other Preventive Recommendations:    · A preventive eye exam performed by an eye specialist is recommended every 1-2 years to screen for glaucoma; cataracts, macular degeneration, and other eye disorders. · A preventive dental visit is recommended every 6 months. · Try to get at least 150 minutes of exercise per week or 10,000 steps per day on a pedometer . · Order or download the FREE \"Exercise & Physical Activity: Your Everyday Guide\" from [employer]. Call 4-242.502.4382 or search [employer] online. · You need 9485-0103 mg of calcium and 3548-0058 IU of vitamin D per day. It is possible to meet your calcium requirement with diet alone, but a vitamin D supplement is usually necessary to meet this goal.  · When exposed to the sun, use a sunscreen that protects against both UVA and UVB radiation with an SPF of 30 or greater. Reapply every 2 to 3 hours or after sweating, drying off with a towel, or swimming. · Always wear a seat belt when traveling in a car. Always wear a helmet when riding a bicycle or motorcycle. Shin Pain (Shin Splints)     What is shin pain? Shin pain is pain on the front of your lower leg below the knee and above the ankle. It can hurt directly over your shinbone (tibia) or over the muscles that are on the inner or outer side of the tibia. Shin pain has often been called shin splints. How does it occur?    Shin pain generally occurs from overuse. This problem can come from irritation of the muscles or other tissues in the lower leg or from a stress fracture. This injury is most common in runners who increase their mileage or the intensity of their running, or who change the surface on which they are running. When you walk or run your foot normally flattens out a small amount when it strikes the ground. If your foot flattens out more than normal it is called over-pronation. Over-pronation can contribute to shin pain. Some specific conditions that cause shin pain include:   Stress fracture: This is a hairline crack in one of the lower leg bones, the tibia or fibula. Medial stress syndrome: This is when the muscles that attach to the inner side of your tibia are inflamed. Compartment syndrome: Your anterior compartment is an area in your leg that contains the muscles that point your foot and toes toward your body. When this compartment is overused the muscles will become painful. What are the symptoms? You have pain over the front part of your lower leg. You may have pain during exercise, at rest, or both. Stress fractures of the tibia will give you pain directly over your shinbone. It will hurt to touch the part of the bone that is fractured. Stress fractures of the fibula will cause pain on the outer side of your lower leg. With medial tibial stress syndrome, you will have pain and tenderness along the edge of the shinbone, especially along the muscles. With compartment syndrome the muscles in that area will be painful. Blood vessels and nerves are also in the anterior compartment. If the muscles in this compartment become swollen during exercise they may irritate these nerves or blood vessels and your foot may become weak, numb, or cold. How is it diagnosed? Your health care provider will examine your lower leg. He or she will decide which part of your shin is the source of the pain.  Your provider may watch you walk or run to see if you have problems with over-pronation. You may need an x-ray or a bone scan to check for stress fractures. If your provider thinks you have compartment syndrome you may need a test that measures the pressure in your lower leg compartments. This is done using a needle attached to a measuring device. They will do this at rest and then again after exercise. How is it treated? Treatment may include:   Ice: Apply ice packs to your shin for 20 to 30 minutes every 3 to 4 hours for 2 or 3 days or until the pain goes away. Ice massage: Freeze water in a Styrofoam cup. Peel the top of the cup away to expose the ice and hold onto the bottom of the cup while you rub ice over your leg for 5 to 10 minutes. Medicine: Take anti-inflammatory medication as prescribed by your health care provider. Shoe supports: Arch supports (orthotics) help correct over-pronation. They can be prescribed and custom-made or you can buy pre-made arch supports at your local pharmacy. Rehabilitation exercises. Surgery: Sometimes with compartment syndrome surgery is needed. The tissues which form the covering of the compartments are opened up to reduce the pressure in the compartments. Some tibial stress fractures also need surgery. While you are recovering from your injury, you will need to change your sport or activity to one that does not make your condition worse. For example, you may need to bicycle or swim instead of run. When you begin to run again, you should wear good shoes and run on soft surfaces. When can I return to my sport or activity? The goal of rehabilitation is to return you to your sport or activity as soon as is safely possible. If you return too soon you may worsen your injury, which could lead to permanent damage. Everyone recovers from injury at a different rate.  Return to your sport or activity will be determined by how soon your leg recovers, not by how many days or weeks it has been since your injury occurred. In general, the longer you have symptoms before you start treatment, the longer it will take to get better. You may safely return to your sport or activity when, starting from the top of the list and progressing to the end, each of the following is true:   You have full range of motion in the injured leg compared to the uninjured leg. You have full strength of the injured leg compared to the uninjured leg. You can jog straight ahead without pain or limping. You can sprint straight ahead without pain or limping. You can do 45-degree cuts, first at half-speed, then at full-speed. You can do 20-yard figures-of-eight, first at half-speed, then at full-speed. You can do 90-degree cuts, first at half-speed, then at full-speed. You can do 10-yard figures-of-eight, first at half-speed, then at full-speed. You can jump on both legs without pain and you can jump on the injured leg without pain. How can I prevent shin pain? Since shin pain usually occurs from overuse, be sure to begin your activities gradually. Wear shoes with proper padding. Run on softer surfaces. Warm up properly and stretch the muscles in the front of your leg and in your calf. Do not keep running while you have shin pain or it will take longer for the pain to go away. Shin Pain (Shin Splints) Rehabilitation Exercises     Start these exercises when your pain has decreased by about 25% from the time when your injury was most painful. Towel stretch: Sit on a hard surface with your injured leg stretched out in front of you. Loop a towel around the ball of your foot and pull the towel toward your body keeping your knee straight. Hold this position for 15 to 30 seconds then relax. Repeat 3 times. When you don't feel much of a stretch using the towel, start using the standing calf stretch. Standing calf stretch: Facing a wall, put your hands against the wall at about eye level.  Keep the injured leg back, the uninjured leg forward, and the heel of your injured leg on the floor. Turn your injured foot slightly inward (as if you were pigeon-toed) as you slowly lean into the wall until you feel a stretch in the back of your calf. Hold for 15 to 30 seconds. Repeat 3 times. Do this exercise several times each day. Ankle range of motion: Sitting or lying down with your legs straight and your knee toward the ceiling, move your ankle up and down, in and out, and in circles. Only move your ankle. Don't move your leg. Repeat 10 times in each direction. Push hard in all directions. Anterior compartment stretch: Stand with one hand against a wall or chair for balance. Bend your knee and grab the front of your foot on your injured leg. Bend the front of the foot toward your heel. You should feel a stretch in the front of your shin. Hold for 15 to 30 seconds. Repeat 3 times. Resisted dorsiflexion: Sit with your injured leg out straight and your foot facing a doorway. Tie a loop in one end of the tubing. Put your foot through the loop so that the tubing goes around the arch of your foot. Tie a knot in the other end of the tubing and shut the knot in the door. Move backward until there is tension in the tubing. Keeping your knee straight, pull your foot toward your body, stretching the tubing. Slowly return to the starting position. Do 3 sets of 10. Resisted plantar flexion: Sit with your leg outstretched and loop the middle section of the tubing around the ball of your foot. Hold the ends of the tubing in both hands. Gently press the ball of your foot down and point your toes, stretching the tubing. Return to the starting position. Do 3 sets of 10. Resisted inversion: Sit with your legs out straight and cross your uninjured leg over your injured ankle. Wrap the tubing around the ball of your injured foot and then loop it around your uninjured foot so that the tubing is anchored there at one end.  Hold the other end of the tubing in your hand. Turn your injured foot inward and upward. This will stretch the tubing. Return to the starting position. Do 3 sets of 10. Resisted eversion: Sit with both legs stretched out in front of you, with your feet about a shoulder's width apart. Tie a loop in one end of the tubing. Put your injured foot through the loop so that the tubing goes around the arch of that foot and wraps around the outside of the uninjured foot. Hold onto the other end of the tubing with your hand to provide tension. Turn your injured foot up and out. Make sure you keep your uninjured foot still so that it will allow the tubing to stretch as you move your injured foot. Return to the starting position. Do 3 sets of 10. Heel raises: Balance yourself while standing behind a chair or counter. Raise your body up onto your toes and hold it for 5 seconds, then slowly lower yourself down. Repeat 10 times. Do 3 sets of 10. Sitting toe raise: Sit in a chair with your feet flat on the floor. Raise the toes and the ball of your injured foot off the floor while keeping your heel on the floor. Hold for 5 seconds. Repeat 10 times. Do 3 sets of 10. Standing toe raises: Stand with your feet flat on the floor, rock back onto your heels and lift your toes off the floor. Hold this for 5 seconds. Do 3 sets of 10. INSTRUCTIONS  NEXT APPOINTMENT: Please schedule annual complete physical (30 minutes) in one year. · PLEASE TAKE THIS FORM TO CHECK-OUT WINDOW TO SCHEDULE NEXT VISIT. · Get annual derm exams. · To rehab leg, do stretched three times a day. · Take ibuprofen 200 mg three times per day for a week. · Medicare part D patients:  Get Shingrix shingles vaccine at pharmacy (such as [facility] or [facility]). Need second dose in 2-6 months. · Get DEXA later this month as planned.

## 2021-10-29 ENCOUNTER — HOSPITAL ENCOUNTER (OUTPATIENT)
Dept: WOMENS IMAGING | Age: 71
Discharge: HOME | End: 2021-10-29
Payer: MEDICARE

## 2021-10-29 DIAGNOSIS — N95.1 MENOPAUSAL STATE: ICD-10-CM

## 2021-10-29 PROCEDURE — 77080 DXA BONE DENSITY AXIAL: CPT

## 2021-12-13 ENCOUNTER — HOSPITAL ENCOUNTER (OUTPATIENT)
Dept: WOMENS IMAGING | Age: 71
Discharge: HOME | End: 2021-12-13
Payer: MEDICARE

## 2021-12-13 DIAGNOSIS — Z12.31 BREAST CANCER SCREENING BY MAMMOGRAM: ICD-10-CM

## 2021-12-13 PROCEDURE — 77063 BREAST TOMOSYNTHESIS BI: CPT

## 2021-12-13 PROCEDURE — 77067 SCR MAMMO BI INCL CAD: CPT

## 2022-03-24 ENCOUNTER — OFFICE VISIT (OUTPATIENT)
Dept: DERMATOLOGY | Age: 72
End: 2022-03-24
Payer: MEDICARE

## 2022-03-24 VITALS — TEMPERATURE: 98.5 F

## 2022-03-24 DIAGNOSIS — Z85.828 HISTORY OF BASAL CELL CARCINOMA: ICD-10-CM

## 2022-03-24 DIAGNOSIS — L82.1 SK (SEBORRHEIC KERATOSIS): Primary | ICD-10-CM

## 2022-03-24 DIAGNOSIS — D22.9 MULTIPLE NEVI: ICD-10-CM

## 2022-03-24 PROCEDURE — 99213 OFFICE O/P EST LOW 20 MIN: CPT | Performed by: DERMATOLOGY

## 2022-03-24 NOTE — PROGRESS NOTES
[facility]  Beatriz Miller MD  [phone]      Phan Echols  1950    73 y.o. female     Date of Visit: 3/24/2022    Chief Complaint: skin lesions    History of Present Illness:    1. She has multiple persistent asymptomatic growths on the trunk and also on the scalp. 2.  She also several moles on the trunk and extremities-not aware of any changes in size, color, or shape. 3.  She has a history of multiple BCCs-denies any signs of recurrence. Superficial BCC on the right shin-treated with curettage on 10/2/2020. Nodular BCC of the right nasal ala-treated with Mohs by Dr. Meghan Jimenez on 10/3/2018. BCC of the nasal tip - treated with Mohs by [place], repair by [employer] in 2010. BCC on the forehead - treated with EDC in 1999.     Dysplastic nevus on the right upper back 2007.  recently diagnosed with melanoma. Had + SLN - getting immunotherapy. Daughter has breast cancer.        Review of Systems:  Gen: Feels well, good sense of health. Past Medical History, Family History, Surgical History, Medications and Allergies reviewed.     Past Medical History:   Diagnosis Date    Arthritis     Colon cancer ([address]     Narrow angle glaucoma suspect of both eyes 1/9/2017    PONV (postoperative nausea and vomiting)     Skin cancer     Thyroid disease      Past Surgical History:   Procedure Laterality Date    BREAST BIOPSY Left 2018    benign    COLECTOMY  2011    cancerous polyp    COLONOSCOPY  09/16/2015    COLONOSCOPY N/A 11/10/2020    COLONOSCOPY WITH BIOPSY performed by Gayla Benavidez MD at [place] [address] N/A 1/8/2019    benign- HYSTEROSCOPY DILATATION AND CURETTAGE performed by Jalen Ram MD at [facility]  10/03/2018    R ala BCC    SKIN CANCER EXCISION      THYROID SURGERY  6/2011    BX        Allergies   Allergen Reactions    Erythromycin Shortness Of Breath    Adhesive Tape Rash bandaids    Neosporin [Neomycin-Polymyxin-Gramicidin] Itching and Swelling     Any medication that ends in \"sporins\" - high sensitivity per PT      Outpatient Medications Marked as Taking for the 3/24/22 encounter (Office Visit) with Feliberto Zuniga MD   Medication Sig Dispense Refill    melatonin 3 MG TABS tablet Take 10 mg by mouth nightly as needed      ibuprofen (ADVIL;MOTRIN) 200 MG tablet Take 200 mg by mouth every 6 hours as needed for Pain      magnesium 30 MG tablet Take 250 mg by mouth daily      Multiple Vitamin (MULTIVITAMIN PO) Take  by mouth daily.  FIBER PO Take  by mouth daily.  Calcium Carbonate-Vit D-Min (CALCIUM 1200 PO) Take  by mouth daily. Physical Examination       The following were examined and determined to be normal: Psych/Neuro, Scalp/hair, Head/face, Conjunctivae/eyelids, Gums/teeth/lips, Neck, Breast/axilla/chest, Abdomen, Back, RUE, LUE, RLE, LLE and Nails/digits. The following were examined and determined to be abnormal: None. Well-appearing. 1.  Scattered on the trunk are numerous stuck on appearing verrucous brown papules and plaques. Scalp with several stuck on appearing waxy yellowish-tan papules and plaques. 2.  Trunk with several scattered round smooth soft pink papules. Extremities with scattered round smooth brown macules. 3.  Clear. Assessment and Plan     1. SK (seborrheic keratosis) - numerous    Reassurance. 2. Multiple nevi - benign appearing    Sun protective behaviors, including use of at least SPF 30 sunscreen, and self skin examinations were encouraged. Call for any new or concerning lesions. 3. History of basal cell carcinomas - clear    Sun protective behaviors, including use of at least SPF 30 sunscreen, and self skin examinations were encouraged. Call for any new or concerning lesions. Return in about 6 months (around 9/24/2022).     --Feliberto Zuniga MD

## 2022-09-22 ENCOUNTER — OFFICE VISIT (OUTPATIENT)
Dept: DERMATOLOGY | Age: 72
End: 2022-09-22
Payer: MEDICARE

## 2022-09-22 DIAGNOSIS — Z85.828 HISTORY OF BASAL CELL CARCINOMA: ICD-10-CM

## 2022-09-22 DIAGNOSIS — D22.9 MULTIPLE NEVI: ICD-10-CM

## 2022-09-22 DIAGNOSIS — L70.8 OTHER ACNE: Primary | ICD-10-CM

## 2022-09-22 DIAGNOSIS — L82.1 SK (SEBORRHEIC KERATOSIS): ICD-10-CM

## 2022-09-22 PROCEDURE — 99213 OFFICE O/P EST LOW 20 MIN: CPT | Performed by: DERMATOLOGY

## 2022-09-22 PROCEDURE — 1123F ACP DISCUSS/DSCN MKR DOCD: CPT | Performed by: DERMATOLOGY

## 2022-09-22 NOTE — PROGRESS NOTES
[facility]  Lucia Knight MD  [phone]      Love Malone  1950    73 y.o. female     Date of Visit: 9/22/2022    Chief Complaint: skin lesions    History of Present Illness:    1. She reports a 1 month  history of an asymptomatic red spot on the cheek that is improving/getting smaller. 2.  She has multiple moles on the trunk and extremities-not aware of any concerning changes. 3.  She has numerous growths on the scalp and trunk. None are bothersome. 4.  She has a history of multiple BCCs-denies any signs of recurrence. Superficial BCC on the right shin-treated with curettage on 10/2/2020. Nodular BCC of the right nasal ala-treated with Mohs by Dr. Pat Michel on 10/3/2018. BCC of the nasal tip - treated with Mohs by [place], repair by [facility] in 2010. BCC on the forehead - treated with EDC in 1999. Dysplastic nevus on the right upper back 2007    Review of Systems:  Gen: Feels well, good sense of health. Past Medical History, Family History, Surgical History, Medications and Allergies reviewed.     Past Medical History:   Diagnosis Date    Arthritis     Colon cancer ([address]     Narrow angle glaucoma suspect of both eyes 1/9/2017    PONV (postoperative nausea and vomiting)     Skin cancer     Thyroid disease      Past Surgical History:   Procedure Laterality Date    BREAST BIOPSY Left 2018    benign    COLECTOMY  2011    cancerous polyp    COLONOSCOPY  09/16/2015    COLONOSCOPY N/A 11/10/2020    COLONOSCOPY WITH BIOPSY performed by Arnold Aldana MD at [facility] N/A 1/8/2019    benign- HYSTEROSCOPY DILATATION AND CURETTAGE performed by Jaylene Patterson MD at [place] [address]  10/03/2018    [facility]    SKIN CANCER EXCISION      THYROID SURGERY  6/2011    BX        Allergies   Allergen Reactions    Erythromycin Shortness Of Breath    Adhesive Tape Rash     bandaids    Neosporin [Neomycin-Polymyxin-Gramicidin] Itching and Swelling     Any medication that ends in \"sporins\" - high sensitivity per PT      Outpatient Medications Marked as Taking for the 9/22/22 encounter (Office Visit) with Beverly Clark MD   Medication Sig Dispense Refill    melatonin 3 MG TABS tablet Take 10 mg by mouth nightly as needed      ibuprofen (ADVIL;MOTRIN) 200 MG tablet Take 200 mg by mouth every 6 hours as needed for Pain      magnesium 30 MG tablet Take 250 mg by mouth daily      Multiple Vitamin (MULTIVITAMIN PO) Take  by mouth daily. FIBER PO Take  by mouth daily. Calcium Carbonate-Vit D-Min (CALCIUM 1200 PO) Take  by mouth daily. Physical Examination       The following were examined and determined to be normal: Psych/Neuro, Scalp/hair, Head/face, Conjunctivae/eyelids, Gums/teeth/lips, Neck, Breast/axilla/chest, Abdomen, Back, RUE, LUE, RLE, LLE, and Nails/digits. The following were examined and determined to be abnormal: None. Well-appearing. 1.  Right medial cheek with a round brightly erythematous macule. 2.  Trunk with scattered round smooth soft pink papules. Extremities with scattered round smooth brown macules. 3.  Scalp, neck, trunk, axillary region with numerous stuck on appearing verrucous brown papules and plaques. 4.  Clear. Assessment and Plan     1. Other acne - resolving    Call if persists. 2. Multiple nevi - benign appearing    Sun protective behaviors, including use of at least SPF 30 sunscreen, and self skin examinations were encouraged. Call for any new or concerning lesions. 3. SK (seborrheic keratosis) - numerous    Reassurance. 4. History of basal cell carcinomas - clear    Sun protective behaviors, including use of at least SPF 30 sunscreen, and self skin examinations were encouraged. Call for any new or concerning lesions. Return in about 6 months (around 3/22/2023).     --Beverly Clark MD

## 2022-10-20 SDOH — HEALTH STABILITY: PHYSICAL HEALTH: ON AVERAGE, HOW MANY DAYS PER WEEK DO YOU ENGAGE IN MODERATE TO STRENUOUS EXERCISE (LIKE A BRISK WALK)?: 5 DAYS

## 2022-10-20 SDOH — HEALTH STABILITY: PHYSICAL HEALTH: ON AVERAGE, HOW MANY MINUTES DO YOU ENGAGE IN EXERCISE AT THIS LEVEL?: 40 MIN

## 2022-10-20 ASSESSMENT — LIFESTYLE VARIABLES
HOW MANY STANDARD DRINKS CONTAINING ALCOHOL DO YOU HAVE ON A TYPICAL DAY: 1 OR 2
HOW OFTEN DO YOU HAVE A DRINK CONTAINING ALCOHOL: 2-4 TIMES A MONTH
HOW OFTEN DO YOU HAVE SIX OR MORE DRINKS ON ONE OCCASION: 1
HOW MANY STANDARD DRINKS CONTAINING ALCOHOL DO YOU HAVE ON A TYPICAL DAY: 1
HOW OFTEN DO YOU HAVE A DRINK CONTAINING ALCOHOL: 3

## 2022-10-20 ASSESSMENT — PATIENT HEALTH QUESTIONNAIRE - PHQ9
SUM OF ALL RESPONSES TO PHQ9 QUESTIONS 1 & 2: 0
SUM OF ALL RESPONSES TO PHQ QUESTIONS 1-9: 0
2. FEELING DOWN, DEPRESSED OR HOPELESS: 0
1. LITTLE INTEREST OR PLEASURE IN DOING THINGS: 0
SUM OF ALL RESPONSES TO PHQ QUESTIONS 1-9: 0

## 2022-10-27 ENCOUNTER — OFFICE VISIT (OUTPATIENT)
Dept: FAMILY MEDICINE CLINIC | Age: 72
End: 2022-10-27
Payer: MEDICARE

## 2022-10-27 VITALS
HEIGHT: 68 IN | HEART RATE: 72 BPM | DIASTOLIC BLOOD PRESSURE: 70 MMHG | SYSTOLIC BLOOD PRESSURE: 100 MMHG | OXYGEN SATURATION: 99 % | BODY MASS INDEX: 22.73 KG/M2 | WEIGHT: 150 LBS

## 2022-10-27 DIAGNOSIS — Z00.00 MEDICARE ANNUAL WELLNESS VISIT, SUBSEQUENT: Primary | ICD-10-CM

## 2022-10-27 DIAGNOSIS — Z23 NEED FOR INFLUENZA VACCINATION: ICD-10-CM

## 2022-10-27 PROCEDURE — G0008 ADMIN INFLUENZA VIRUS VAC: HCPCS | Performed by: FAMILY MEDICINE

## 2022-10-27 PROCEDURE — 1123F ACP DISCUSS/DSCN MKR DOCD: CPT | Performed by: FAMILY MEDICINE

## 2022-10-27 PROCEDURE — G0439 PPPS, SUBSEQ VISIT: HCPCS | Performed by: FAMILY MEDICINE

## 2022-10-27 PROCEDURE — 90694 VACC AIIV4 NO PRSRV 0.5ML IM: CPT | Performed by: FAMILY MEDICINE

## 2022-10-27 SDOH — ECONOMIC STABILITY: FOOD INSECURITY: WITHIN THE PAST 12 MONTHS, THE FOOD YOU BOUGHT JUST DIDN'T LAST AND YOU DIDN'T HAVE MONEY TO GET MORE.: NEVER TRUE

## 2022-10-27 SDOH — ECONOMIC STABILITY: FOOD INSECURITY: WITHIN THE PAST 12 MONTHS, YOU WORRIED THAT YOUR FOOD WOULD RUN OUT BEFORE YOU GOT MONEY TO BUY MORE.: NEVER TRUE

## 2022-10-27 ASSESSMENT — SOCIAL DETERMINANTS OF HEALTH (SDOH): HOW HARD IS IT FOR YOU TO PAY FOR THE VERY BASICS LIKE FOOD, HOUSING, MEDICAL CARE, AND HEATING?: NOT HARD AT ALL

## 2022-10-27 NOTE — PATIENT INSTRUCTIONS
INSTRUCTIONS  NEXT APPOINTMENT: Please schedule annual complete physical (30 minutes) in one year    PLEASE TAKE THIS FORM TO CHECK-OUT WINDOW TO SCHEDULE NEXT VISIT. PLEASE GET FASTING BLOODWORK DRAWN SOON. Lab is on first floor in [address]. Hours Monday to Friday 6:30 AM to 4 PM.     Would get new COVID booster soon. Has better coverage for Omicron variant. Separate from other vaccines by at least 2 weeks. Medicare part D patients:  Get Shingrix shingles vaccine at pharmacy (such as [facility] or [facility]). Need second dose in 2-6 months. Due for tetanus booster which prevents lockjaw from injuries. 2 options:  Medicare only covers for injury. Call to be seen for tetanus booster if you have any cuts, punctures or other open skin injury. OR get tetanus booster at pharmacy (like [facility]) for approximately $50.     Mammogram due after 12/13/22. Follow-up with GYN as planned this year. Patient Education        STRESS: HOW TO BETTER COPE    What causes stress? Feelings of stress are caused by the body's instinct to defend itself. This instinct is good in emergencies, such as getting out of the way of a speeding car. But stress can cause unhealthy physical symptoms if it goes on for too long, such as in response to life's daily challenges and changes. When this happens, it's as though your body gets ready to jump out of the way of the car, but you're sitting still. Your body is working overtime, with no place to put all the extra energy. This can make you feel anxious, afraid, worried and uptight. What changes may be stressful? Any sort of change can make you feel stressed, even good change. It's not just the change or event itself, but also how you react to it that matters. What's stressful is different for each person. For example, one person may feel stressed by retiring from work, while someone else may not.   Other things that may be stressful include being laid off from your job, your child leaving or returning home, the death of your spouse, divorce or marriage, an illness, an injury, a job promotion, money problems, moving, or having a baby. Can stress hurt my health? Stress can cause health problems or make health problems worse. Talk to your family doctor if you think some of your symptoms are caused by stress. It's important to make sure that your symptoms aren't caused by other health problems. Possible signs of stress  Anxiety   Back pain   Constipation or diarrhea   Depression   Fatigue   Headaches   High blood pressure   Trouble sleeping or insomnia   Problems with relationships   Shortness of breath   Stiff neck or jaw   Upset stomach   Weight gain or loss     What can I do to manage my stress? The first step is to learn to recognize when you're feeling stressed. Early warning signs of stress include tension in your shoulders and neck, or clenching your hands into fists. The next step is to choose a way to deal with your stress. One way is to avoid the event or thing that leads to your stress--but often this is not possible. A second way is to change how you react to stress. This is often the more practical way. Tips for dealing with stress  Don't worry about things you can't control, such as the weather. Solve the little problems. This can help you gain a feeling of control. Prepare to the best of your ability for events you know may be stressful, such as a job interview. Try to look at change as a positive challenge, not as a threat. Work to resolve conflicts with other people. Talk with a trusted friend, family member or counselor. Set realistic goals at home and at work. Avoid overscheduling. Exercise on a regular basis. Eat regular, well-balanced meals and get enough sleep. Meditate. Participate in something you don't find stressful, such as sports, social events or hobbies. Why is exercise useful?   Exercise is a good way to deal with stress because it's a healthy way to relieve your pent-up energy and tension. Exercise is known to release feel-good brain chemicals. It also helps you get in better shape, which makes you feel better overall. Steps to deep breathing  Lie down on a flat surface. Place a hand on your stomach, just above your navel. Place the other hand on your chest.   Breathe in slowly and try to make your stomach rise a little. Hold your breath for a second. Breathe out slowly and let your stomach go back down. What is meditation? Meditation is a form of guided thought. It can take many forms. You can do it with exercise that uses the same motions over and over, like walking or swimming. You can meditate by practicing relaxation training, by stretching or by breathing deeply. Relaxation training is simple. Start with one muscle. Hold it tight for a few seconds then relax the muscle. Do this with each of your muscles, beginning with the toes and feet and working your way up through the rest of your body, one muscle group at a time. Stretching can also help relieve tension. Roll your head in a gentle [place]. Reach toward the ceiling and bend side to side slowly. Roll your shoulders. Deep, relaxed breathing by itself may help relieve stress (see the box to the right). This helps you get plenty of oxygen and activates the relaxation response, the bodys antidote to stress. Personalized Preventive Plan for Eliana Sandra - 10/27/2022  Medicare offers a range of preventive health benefits. Some of the tests and screenings are paid in full while other may be subject to a deductible, co-insurance, and/or copay. Some of these benefits include a comprehensive review of your medical history including lifestyle, illnesses that may run in your family, and various assessments and screenings as appropriate.     After reviewing your medical record and screening and assessments performed today your provider may have ordered immunizations, labs, imaging, and/or referrals for you. A list of these orders (if applicable) as well as your Preventive Care list are included within your After Visit Summary for your review. Other Preventive Recommendations:    A preventive eye exam performed by an eye specialist is recommended every 1-2 years to screen for glaucoma; cataracts, macular degeneration, and other eye disorders. A preventive dental visit is recommended every 6 months. Try to get at least 150 minutes of exercise per week or 10,000 steps per day on a pedometer . Order or download the FREE \"Exercise & Physical Activity: Your Everyday Guide\" from The [employer]. Call 7-606.445.4295 or search The "[employer] Data on Aging online. You need 5043-1006 mg of calcium and 4611-2913 IU of vitamin D per day. It is possible to meet your calcium requirement with diet alone, but a vitamin D supplement is usually necessary to meet this goal.  When exposed to the sun, use a sunscreen that protects against both UVA and UVB radiation with an SPF of 30 or greater. Reapply every 2 to 3 hours or after sweating, drying off with a towel, or swimming. Always wear a seat belt when traveling in a car. Always wear a helmet when riding a bicycle or motorcycle.

## 2022-10-27 NOTE — PROGRESS NOTES
Medicare Annual Wellness Visit  Name: Adela Pimentel  YOB: 1950  Age: 73 y.o. Sex: female  MRN: [id number]     Date of Service:  10/27/2022    Chief Complaint:   Adela Pimentel is a 73 y.o. female who presents for Medicare Annual Wellness Visit and check-up for:  1. Medicare annual wellness visit, subsequent    2. Need for influenza vaccination      HPI  Chief Complaint   Patient presents with    Medicare AWV     AWV   Complaints: D+C last year for polyp, no cancer  Caring for  with melanoma and cardiac, DOP with breast CA. Not getting overwhelmed    Review of Systems - unremarable except as noted above    HISTORY:  Patient's medications, allergies, past medical, and social histories were reviewed and updated as appropriate.      CHART REVIEW  Health Maintenance   Topic Date Due    Shingles vaccine (2 of 3) 04/26/2013    Cervical cancer screen  11/26/2021    COVID-19 Vaccine (4 - Booster for Moderna series) 12/27/2021    DTaP/Tdap/Td vaccine (2 - Td or Tdap) 02/17/2022    Pneumococcal 65+ years Vaccine (2 - PPSV23 if available, else PCV20) 12/26/2023 (Originally 12/26/2019)    Breast cancer screen  12/13/2022    Depression Screen  10/20/2023    Annual Wellness Visit (AWV)  10/28/2023    Lipids  03/25/2024    DEXA (modify frequency per FRAX score)  10/29/2024    Colorectal Cancer Screen  11/12/2025    Flu vaccine  Completed    Hepatitis C screen  Completed    Hepatitis A vaccine  Aged Out    Hib vaccine  Aged Out    Meningococcal (ACWY) vaccine  Aged Out     The ASCVD Risk score (Arnel COE, et al., 2019) failed to calculate for the following reasons:    Cannot find a previous HDL lab    Cannot find a previous total cholesterol lab  Current Outpatient Medications   Medication Instructions    Calcium Carbonate-Vit D-Min (CALCIUM 1200 PO) DAILY    FIBER PO DAILY    ibuprofen (ADVIL;MOTRIN) 200 mg, Oral, EVERY 6 HOURS PRN    magnesium 250 mg, Oral, DAILY    melatonin 10 mg, Oral, NIGHTLY PRN Multiple Vitamin (MULTIVITAMIN PO) DAILY      Family History   Problem Relation Age of Onset    Osteoporosis Mother     Cancer Father         ethel. melanoma     Heart Disease Maternal Grandfather     Breast Cancer Natural Child     Diabetes Neg Hx     High Cholesterol Neg Hx     Hypertension Neg Hx     Stroke Neg Hx     Thyroid Disease Neg Hx     Seizures Neg Hx      Social History     Tobacco Use    Smoking status: Never    Smokeless tobacco: Never    Tobacco comments:     advised not to start   Vaping Use    Vaping Use: Never used   Substance Use Topics    Alcohol use:  Yes     Alcohol/week: 2.0 standard drinks     Types: 1 Standard drinks or equivalent, 1 Glasses of wine per week    Drug use: No      Immunization History   Administered Date(s) Administered    COVID-19, MODERNA BLUE border, Primary or Immunocompromised, (age 12y+), IM, 100 mcg/0.5mL 02/09/2021, 03/09/2021, 11/01/2021    INFLUENZA, INTRADERMAL, QUADRIVALENT, PRESERVATIVE FREE 10/09/2017    Influenza A (D1A1-12) Vaccine PF IM 12/15/2009    Influenza Vaccine, unspecified formulation 10/01/2014, 10/12/2016    Influenza Virus Vaccine 10/12/2016, 09/30/2020, 10/04/2021    Influenza, FLUAD, (age 72 y+), Adjuvanted, 0.5mL 10/27/2022    Influenza, FLUBLOK, (age 25 y+), PF, 0.5mL 10/16/2019    Influenza, FLUZONE (age 72 y+), High Dose, 0.7mL 10/04/2021    Influenza, High Dose (Fluzone 65 yrs and older) 10/11/2018, 10/11/2018    Pneumococcal Conjugate 13-valent ([id number]) 12/26/2018    Tdap (Boostrix, Adacel) 02/17/2012, 02/17/2012    Zoster Live (Zostavax) 03/01/2013, 03/01/2013     LAST LABS  Cholesterol, Total   Date Value Ref Range Status   03/25/2019 157 0 - 199 mg/dL Final     LDL Calculated   Date Value Ref Range Status   03/25/2019 85 <100 mg/dL Final     HDL   Date Value Ref Range Status   03/25/2019 62 (H) 40 - 60 mg/dL Final   06/05/2012 62 (H) 40 - 60 mg/dl Final     Triglycerides   Date Value Ref Range Status   03/25/2019 50 0 - 150 mg/dL Final     Lab Results   Component Value Date     08/17/2021    K 4.6 08/17/2021    CREATININE 0.6 08/17/2021     Lab Results   Component Value Date    WBC 6.1 08/17/2021    HGB 14.6 08/17/2021    HCT 42.1 08/17/2021    MCV 93.3 08/17/2021     08/17/2021     Lab Results   Component Value Date    ALT 14 08/17/2021    AST 20 08/17/2021    ALKPHOS 61 08/17/2021    BILITOT 1.3 (H) 08/17/2021     TSH (uIU/mL)   Date Value   03/25/2019 0.64     Lab Results   Component Value Date    GLUCOSE 80 08/17/2021   No results found for: LABA1C     CareTeam (Including outside providers/suppliers regularly involved in providing care):   Patient Care Team:  Jing Welsh MD as PCP - Avis Hutson MD as PCP - Nicky Lacyled Provider  Jeremy Lugo MD as Consulting Physician (Endocrinology)    The following problems were reviewed today and where indicated follow up appointments were made and/or referrals ordered. Positive Risk Factor Screenings with Interventions:      General Health and ACP:  General  In general, how would you say your health is?: Very Good  In the past 7 days, have you experienced any of the following: New or Increased Pain, New or Increased Fatigue, Loneliness, Social Isolation, Stress or Anger?: (!) Yes  Select all that apply: (!) Stress  Do you get the social and emotional support that you need?: Yes  Do you have a Living Will?: Yes    Advance Directives       Power of  Living Will ACP-Advance Directive ACP-Power of Dyan Santillan on 10/12/21 Filed on 10/12/21 Filed [place] Nagy Risk Interventions:  Stress: handout given. Declines counseling. Current Health Maintenance Status  Recommendations for Preventive Services Due: see orders.   Recommended screening schedule for the next 5-10 years is provided to the patient in written form: see Patient Instructions/AVS.    PHYSICAL EXAM:  VITALS:  /70 (Site: Left Upper Arm, Position: Sitting, Cuff Size: Medium Adult)   Pulse 72   Ht 5' 8\" (1.727 m)   Wt 150 lb (68 kg)   SpO2 99%   BMI 22.81 kg/m²   BP Readings from Last 5 Encounters:   10/27/22 100/70   10/12/21 116/72   08/17/21 126/72   11/10/20 99/63   11/10/20 (!) 105/57     Wt Readings from Last 5 Encounters:   10/27/22 150 lb (68 kg)   10/12/21 153 lb (69.4 kg)   08/17/21 153 lb (69.4 kg)   11/10/20 159 lb 6.4 oz (72.3 kg)   08/07/20 151 lb (68.5 kg)   Body mass index is 22.81 kg/m². GENERAL: well-developed, well-nourished, alert, no distress, calm   EYES: negative findings: lids and lashes normal and conjunctivae and sclerae normal  ENT: normal TM's and external ear canals both ears  External nose and ears appear normal  Pharynx: normal. Exudates: None  Lips, mucosa, and tongue normal  Hearing grossly normal.    NECK: No adenopathy, supple, symmetrical, trachea midline  Thyroid not enlarged, symmetric, no tenderness/mass/nodules  no cervical nodes, no supraclavicular nodes  LUNGS:  Breathing unlabored  clear to auscultation bilaterally and good air movement  CARDIOVASC: regular rate and rhythm, S1, S2 normal  LEGS:  Lower extremity edema: none    No carotid bruits  ABDOMEN: Soft, non-tender, no masses  No hepatosplenomegaly  No hernias noted. Exam limited by N/A  SKIN: warm and dry  No rashes or suspicious lesions  PSYCH:  Alert and oriented  Normal reasoning, insight good  Facial expressions full, mood appropriate  No memory disturbance noted  MUSCULOSKEL:  No significant finger or nail findings  Spine symmetric, no deformities, no kyphosis   GAIT: UP and Go test: <30 seconds with gait: normal.  Speed Normal.  No significant balance checks. No extra steps on turn around. Assistive device: none        Assessment and Plan:      Diagnosis Orders   1. Medicare annual wellness visit, subsequent  Comprehensive Metabolic Panel    Lipid Panel      2. Need for influenza vaccination  Influenza, FLUAD, (age 72 y+), IM, Preservative Free, 0.5 mL      Stable. INSTRUCTIONS  NEXT APPOINTMENT: Please schedule annual complete physical (30 minutes) in one year    PLEASE TAKE THIS FORM TO CHECK-OUT WINDOW TO SCHEDULE NEXT VISIT. PLEASE GET FASTING BLOODWORK DRAWN SOON. Lab is on first floor in [address]. Hours Monday to Friday 6:30 AM to 4 PM.     Would get new COVID booster soon. Has better coverage for Omicron variant. Separate from other vaccines by at least 2 weeks. Medicare part D patients:  Get Shingrix shingles vaccine at pharmacy (such as [facility] or [email or web address]). Need second dose in 2-6 months. Due for tetanus booster which prevents lockjaw from injuries. 2 options:  Medicare only covers for injury. Call to be seen for tetanus booster if you have any cuts, punctures or other open skin injury. OR get tetanus booster at pharmacy (like [facility]) for approximately $50.     Mammogram due after 12/13/22. Follow-up with GYN as planned this year.

## 2022-10-28 DIAGNOSIS — Z00.00 MEDICARE ANNUAL WELLNESS VISIT, SUBSEQUENT: ICD-10-CM

## 2022-10-28 LAB
A/G RATIO: 2.1 (ref 1.1–2.2)
ALBUMIN SERPL-MCNC: 4.4 G/DL (ref 3.4–5)
ALP BLD-CCNC: 62 U/L (ref 40–129)
ALT SERPL-CCNC: 16 U/L (ref 10–40)
ANION GAP SERPL CALCULATED.3IONS-SCNC: 11 MMOL/L (ref 3–16)
AST SERPL-CCNC: 16 U/L (ref 15–37)
BILIRUB SERPL-MCNC: 1.3 MG/DL (ref 0–1)
BUN BLDV-MCNC: 10 MG/DL (ref 7–20)
CALCIUM SERPL-MCNC: 9.4 MG/DL (ref 8.3–10.6)
CHLORIDE BLD-SCNC: 105 MMOL/L (ref 99–110)
CHOLESTEROL, TOTAL: 155 MG/DL (ref 0–199)
CO2: 27 MMOL/L (ref 21–32)
CREAT SERPL-MCNC: 0.7 MG/DL (ref 0.6–1.2)
GFR SERPL CREATININE-BSD FRML MDRD: >60 ML/MIN/{1.73_M2}
GLUCOSE BLD-MCNC: 89 MG/DL (ref 70–99)
HDLC SERPL-MCNC: 62 MG/DL (ref 40–60)
LDL CHOLESTEROL CALCULATED: 80 MG/DL
POTASSIUM SERPL-SCNC: 4 MMOL/L (ref 3.5–5.1)
SODIUM BLD-SCNC: 143 MMOL/L (ref 136–145)
TOTAL PROTEIN: 6.5 G/DL (ref 6.4–8.2)
TRIGL SERPL-MCNC: 66 MG/DL (ref 0–150)
VLDLC SERPL CALC-MCNC: 13 MG/DL

## 2023-01-20 ENCOUNTER — HOSPITAL ENCOUNTER (OUTPATIENT)
Dept: WOMENS IMAGING | Age: 73
Discharge: HOME | End: 2023-01-20
Payer: MEDICARE

## 2023-01-20 DIAGNOSIS — Z12.31 BREAST CANCER SCREENING BY MAMMOGRAM: ICD-10-CM

## 2023-01-20 PROCEDURE — 77063 BREAST TOMOSYNTHESIS BI: CPT

## 2023-03-16 ENCOUNTER — OFFICE VISIT (OUTPATIENT)
Dept: DERMATOLOGY | Age: 73
End: 2023-03-16
Payer: MEDICARE

## 2023-03-16 DIAGNOSIS — Z85.828 HISTORY OF BASAL CELL CARCINOMA: ICD-10-CM

## 2023-03-16 DIAGNOSIS — L81.4 SOLAR LENTIGO: ICD-10-CM

## 2023-03-16 DIAGNOSIS — L82.1 SK (SEBORRHEIC KERATOSIS): Primary | ICD-10-CM

## 2023-03-16 DIAGNOSIS — D22.9 MULTIPLE NEVI: ICD-10-CM

## 2023-03-16 PROCEDURE — 1123F ACP DISCUSS/DSCN MKR DOCD: CPT | Performed by: DERMATOLOGY

## 2023-03-16 PROCEDURE — 99213 OFFICE O/P EST LOW 20 MIN: CPT | Performed by: DERMATOLOGY

## 2023-03-16 NOTE — PROGRESS NOTES
Patient presented with CADD pump connected. Reservoir empty. Disconnected CADD pump, flushed port with 0.9% Normal Saline and established blood return in port. Flushed with 500 units of Heparin and de-accessed port.  Gauze and paper tape applied to port sit [facility]  Prisca Vogt MD  [phone]      Syl Braun  1950    81 y.o. female     Date of Visit: 3/16/2023    Chief Complaint: skin lesions    History of Present Illness:    1. She has numerous growths on the trunk and extremities-not aware of any changes, none are symptomatic. 2.  She has stable freckling on the upper extremities. 3.  She has multiple moles on the trunk and extremities-not aware of any changes in size, color, or shape. 4.  She has a history of multiple BCCs-denies any signs of recurrence. Superficial BCC on the right shin-treated with curettage on 10/2/2020. Nodular BCC of the right nasal ala-treated with Mohs by Dr. Maegan Villalobos on 10/3/2018. BCC of the nasal tip - treated with Mohs by [place], repair by [facility] in 2010. BCC on the forehead - treated with EDC in 1999. Dysplastic nevus on the right upper back 2007      Review of Systems:  Gen: Feels well, good sense of health. Skin: No new or changing moles. Past Medical History, Family History, Surgical History, Medications and Allergies reviewed.     Past Medical History:   Diagnosis Date    Arthritis     Colon cancer ([address]     Narrow angle glaucoma suspect of both eyes 1/9/2017    PONV (postoperative nausea and vomiting)     Skin cancer     Thyroid disease      Past Surgical History:   Procedure Laterality Date    BREAST BIOPSY Left 2018    benign    COLECTOMY  2011    cancerous polyp    COLONOSCOPY  09/16/2015    COLONOSCOPY N/A 11/10/2020    COLONOSCOPY WITH BIOPSY performed by Vidal Gonzalez MD at [facility] N/A 1/8/2019    benign- HYSTEROSCOPY DILATATION AND CURETTAGE performed by Michelle Oliveros MD at [place] [address]  10/03/2018    R ala [facility]    SKIN CANCER EXCISION      THYROID SURGERY  6/2011    BX        Allergies   Allergen Reactions    Erythromycin Shortness Of Breath    Adhesive Tape Rash     bandaids Neosporin [Neomycin-Polymyxin-Gramicidin] Itching and Swelling     Any medication that ends in \"sporins\" - high sensitivity per PT      Outpatient Medications Marked as Taking for the 3/16/23 encounter (Office Visit) with Venkatesh Meza MD   Medication Sig Dispense Refill    melatonin 3 MG TABS tablet Take 10 mg by mouth nightly as needed      ibuprofen (ADVIL;MOTRIN) 200 MG tablet Take 200 mg by mouth every 6 hours as needed for Pain      magnesium 30 MG tablet Take 250 mg by mouth daily      Multiple Vitamin (MULTIVITAMIN PO) Take  by mouth daily. FIBER PO Take  by mouth daily. Calcium Carbonate-Vit D-Min (CALCIUM 1200 PO) Take  by mouth daily. Physical Examination       The following were examined and determined to be normal: Psych/Neuro, Scalp/hair, Head/face, Conjunctivae/eyelids, Gums/teeth/lips, Neck, Breast/axilla/chest, Abdomen, Back, RUE, LUE, RLE, LLE, and Nails/digits. The following were examined and determined to be abnormal: None. Well appearing. 1.  Lower neck, trunk with numerous stuck on appearing verrucous tan-brown papules and plaques. 2.  Extensor upper extremities with multiple scattered round light brown macules. 3.  Trunk and extremities with multiple well-defined round of uniformly brown macules and few papules. 4.  Clear        Assessment and Plan     1. SK (seborrheic keratosis)     Reassurance. 2. Solar lentigines    Monitor for change. Sun protective behaviors encouraged including use of at least SPF 30 or more sunscreen. 3. Multiple nevi - benign appearing    Sun protective behaviors, including use of at least SPF 30 sunscreen, and self skin examinations were encouraged. Call for any new or concerning lesions. 4. History of basal cell carcinomas - clear    Sun protective behaviors, including use of at least SPF 30 sunscreen, and self skin examinations were encouraged. Call for any new or concerning lesions. Return in about 6 months (around 9/16/2023).     --Yesenia Landeros MD

## 2023-05-24 ENCOUNTER — TELEPHONE (OUTPATIENT)
Dept: DERMATOLOGY | Age: 73
End: 2023-05-24

## 2023-05-26 ENCOUNTER — OFFICE VISIT (OUTPATIENT)
Dept: DERMATOLOGY | Age: 73
End: 2023-05-26

## 2023-05-26 DIAGNOSIS — L82.0 INFLAMED SEBORRHEIC KERATOSIS: Primary | ICD-10-CM

## 2023-05-26 NOTE — PATIENT INSTRUCTIONS

## 2023-05-26 NOTE — PROGRESS NOTES
[facility]  Katiana Nicholas MD  [phone]      Mary Wang  1950    81 y.o. female     Date of Visit: 5/26/2023    Chief Complaint: skin lesion    History of Present Illness:    She presents today for a recurrently bleeding lesion on the right crown of the scalp. Derm Hx:     Superficial BCC on the right shin-treated with curettage on 10/2/2020. Nodular BCC of the right nasal ala-treated with Mohs by Dr. Nicole Al on 10/3/2018. BCC of the nasal tip - treated with Mohs by [place], repair by [facility] in 2010. BCC on the forehead - treated with EDC in 1999. Dysplastic nevus on the right upper back 2007    Review of Systems:  Gen: Feels well, good sense of health. Past Medical History, Family History, Surgical History, Medications and Allergies reviewed.     Past Medical History:   Diagnosis Date    Arthritis     Colon cancer ([address]     Narrow angle glaucoma suspect of both eyes 1/9/2017    PONV (postoperative nausea and vomiting)     Skin cancer     Thyroid disease      Past Surgical History:   Procedure Laterality Date    BREAST BIOPSY Left 2018    benign    COLECTOMY  2011    cancerous polyp    COLONOSCOPY  09/16/2015    COLONOSCOPY N/A 11/10/2020    COLONOSCOPY WITH BIOPSY performed by Darvin Jimenez MD at [address] [place] [address] N/A 1/8/2019    benign- HYSTEROSCOPY DILATATION AND CURETTAGE performed by Chang Schwab MD at [place] [address]  10/03/2018    R ala BCC    SKIN CANCER EXCISION      THYROID SURGERY  6/2011    BX        Allergies   Allergen Reactions    Erythromycin Shortness Of Breath    Adhesive Tape Rash     bandaids    Neosporin [Neomycin-Polymyxin-Gramicidin] Itching and Swelling     Any medication that ends in \"sporins\" - high sensitivity per PT      Outpatient Medications Marked as Taking for the 5/26/23 encounter (Office Visit) with Moose Abbott MD   Medication Sig Dispense Refill    melatonin 3 MG

## 2023-06-01 LAB — DERMATOLOGY PATHOLOGY REPORT: NORMAL

## 2023-06-01 NOTE — H&P
[facility]   Pre-operative History and Physical    Patient: Bonita Tenorio  : 1950  Acct#:         HISTORY OF PRESENT ILLNESS:    The patient is a 81 y.o. female  who presents with polyp surveillance  Past Medical History:        Diagnosis Date    Arthritis     Colon cancer ([address]     Narrow angle glaucoma suspect of both eyes 2017    PONV (postoperative nausea and vomiting)     Skin cancer     Thyroid disease      Past Surgical History:        Procedure Laterality Date    BREAST BIOPSY Left 2018    benign    COLECTOMY  2011    cancerous polyp    COLONOSCOPY  2015    DILATION AND CURETTAGE OF UTERUS N/A 2019    benign- HYSTEROSCOPY DILATATION AND CURETTAGE performed by Rich Harding MD at [place] [id number]  10/03/2018     lesli Wallace 81 THYROID SURGERY  2011    BX      Medications prior to admission:   Prior to Admission medications    Medication Sig Start Date End Date Taking? Authorizing Provider   melatonin 3 MG TABS tablet Take 10 mg by mouth nightly as needed   Yes Historical Provider, MD   ibuprofen (ADVIL;MOTRIN) 200 MG tablet Take 200 mg by mouth every 6 hours as needed for Pain   Yes Historical Provider, MD   magnesium 30 MG tablet Take 250 mg by mouth daily   Yes Historical Provider, MD   Multiple Vitamin (MULTIVITAMIN PO) Take  by mouth daily. Yes Historical Provider, MD   FIBER PO Take  by mouth daily. Yes Historical Provider, MD   Calcium Carbonate-Vit D-Min (CALCIUM 1200 PO) Take  by mouth daily. Yes Historical Provider, MD     Allergies:    Erythromycin;  Adhesive tape; and Neosporin [neomycin-polymyxin-gramicidin]  Social History:   Social History     Socioeconomic History    Marital status:      Spouse name: Not on file    Number of children: Not on file    Years of education: Not on file    Highest education level: Not on file   Occupational History    Not on file   Social Needs    Financial resource strain: Not on file    Food insecurity     Worry: Not on file     Inability: Not on file    Transportation needs     Medical: Not on file     Non-medical: Not on file   Tobacco Use    Smoking status: Never Smoker    Smokeless tobacco: Never Used    Tobacco comment: advised not to start   Substance and Sexual Activity    Alcohol use: Yes     Alcohol/week: 2.0 standard drinks     Types: 1 Standard drinks or equivalent, 1 Glasses of wine per week    Drug use: No    Sexual activity: Yes   Lifestyle    Physical activity     Days per week: Not on file     Minutes per session: Not on file    Stress: Not on file   Relationships    Social connections     Talks on phone: Not on file     Gets together: Not on file     Attends Mu-ism service: Not on file     Active member of club or organization: Not on file     Attends meetings of clubs or organizations: Not on file     Relationship status: Not on file    Intimate partner violence     Fear of current or ex partner: Not on file     Emotionally abused: Not on file     Physically abused: Not on file     Forced sexual activity: Not on file   Other Topics Concern    Not on file   Social History Narrative    Exercise: walking daily. Lives with spouse. Seatbelt use: Always. Living will:  no,   additional information provided.               Family History:   Family History   Problem Relation Age of Onset    Cancer Father         malig. melanoma     Heart Disease Maternal Grandfather     Osteoporosis Mother     Diabetes Neg Hx     High Cholesterol Neg Hx     Hypertension Neg Hx     Stroke Neg Hx     Thyroid Disease Neg Hx     Seizures Neg Hx          PHYSICAL EXAM:      /67   Pulse 88   Temp 97.2 °F (36.2 °C) (Temporal)   Resp 16   Ht 5' 8\" (1.727 m)   Wt 159 lb 6.4 oz (72.3 kg)   SpO2 99%   BMI 24.24 kg/m²  I        Heart: Normal    Lungs: Normal    Abdomen: Normal      ASA Grade: ASA 2 - Patient with mild systemic disease with no left hand/yes(specify)

## 2023-07-21 ENCOUNTER — OFFICE VISIT (OUTPATIENT)
Dept: FAMILY MEDICINE CLINIC | Age: 73
End: 2023-07-21

## 2023-07-21 VITALS
DIASTOLIC BLOOD PRESSURE: 64 MMHG | BODY MASS INDEX: 22.13 KG/M2 | WEIGHT: 146 LBS | HEART RATE: 81 BPM | HEIGHT: 68 IN | OXYGEN SATURATION: 98 % | SYSTOLIC BLOOD PRESSURE: 110 MMHG

## 2023-07-21 DIAGNOSIS — Z01.818 PREOP EXAMINATION: Primary | ICD-10-CM

## 2023-07-21 DIAGNOSIS — H40.033 NARROW ANGLE GLAUCOMA SUSPECT OF BOTH EYES: ICD-10-CM

## 2023-07-21 DIAGNOSIS — E78.5 HYPERLIPIDEMIA, UNSPECIFIED HYPERLIPIDEMIA TYPE: ICD-10-CM

## 2023-07-21 DIAGNOSIS — Z85.828 HISTORY OF BASAL CELL CARCINOMA: ICD-10-CM

## 2023-07-21 DIAGNOSIS — E04.2 MULTINODULAR GOITER: ICD-10-CM

## 2023-07-21 DIAGNOSIS — Z85.038 HISTORY OF COLON CANCER: ICD-10-CM

## 2023-07-21 DIAGNOSIS — H25.9 AGE-RELATED CATARACT OF BOTH EYES, UNSPECIFIED AGE-RELATED CATARACT TYPE: ICD-10-CM

## 2023-07-21 RX ORDER — AMOXICILLIN AND CLAVULANATE POTASSIUM 875; 125 MG/1; MG/1
1 TABLET, FILM COATED ORAL 2 TIMES DAILY
COMMUNITY
Start: 2023-07-18

## 2023-07-21 ASSESSMENT — PATIENT HEALTH QUESTIONNAIRE - PHQ9
SUM OF ALL RESPONSES TO PHQ QUESTIONS 1-9: 0
1. LITTLE INTEREST OR PLEASURE IN DOING THINGS: 0
SUM OF ALL RESPONSES TO PHQ QUESTIONS 1-9: 0
SUM OF ALL RESPONSES TO PHQ9 QUESTIONS 1 & 2: 0
2. FEELING DOWN, DEPRESSED OR HOPELESS: 0
SUM OF ALL RESPONSES TO PHQ QUESTIONS 1-9: 0
SUM OF ALL RESPONSES TO PHQ QUESTIONS 1-9: 0

## 2023-07-21 NOTE — PROGRESS NOTES
Preoperative Consultation      Leena Parker  YOB: 1950    Date of Service:  7/21/2023    Vitals:    07/21/23 0837   BP: 110/64   Site: Right Upper Arm   Position: Sitting   Cuff Size: Medium Adult   Pulse: 81   SpO2: 98%   Weight: 146 lb (66.2 kg)   Height: 5' 8\" (1.727 m)      Wt Readings from Last 2 Encounters:   07/21/23 146 lb (66.2 kg)   10/27/22 150 lb (68 kg)     BP Readings from Last 3 Encounters:   07/21/23 110/64   10/27/22 100/70   10/12/21 116/72        Chief Complaint   Patient presents with    Pre-op Exam     Pre-op cataract surgery Dr. Parisi Stacks 7/26/23 & 8/9/23. Allergies   Allergen Reactions    Erythromycin Shortness Of Breath    Adhesive Tape Rash     bandaids    Neosporin [Neomycin-Polymyxin-Gramicidin] Itching and Swelling     Any medication that ends in \"sporins\" - high sensitivity per PT      Outpatient Medications Marked as Taking for the 7/21/23 encounter (Office Visit) with MABEL Haider - CNP   Medication Sig Dispense Refill    amoxicillin-clavulanate (AUGMENTIN) 875-125 MG per tablet Take 1 tablet by mouth 2 times daily      melatonin 3 MG TABS tablet Take 10 mg by mouth nightly as needed      ibuprofen (ADVIL;MOTRIN) 200 MG tablet Take 1 tablet by mouth every 6 hours as needed for Pain      magnesium 30 MG tablet Take 250 mg by mouth daily      Multiple Vitamin (MULTIVITAMIN PO) Take  by mouth daily. FIBER PO Take  by mouth daily. Calcium Carbonate-Vit D-Min (CALCIUM 1200 PO) Take  by mouth daily. This patient presents to the office today for a preoperative consultation at the request of surgeon, Dr. Karen Motley, who plans on performing Cataract Surgery [facility] on  7/26/2023 and   8/9/2023  at [facility]. She was diagnosed with narrow angle glaucoma- has been watching her for about 10 years waiting for cataract surgery. By removing the cataracts, this will also relieve the pressure from the glaucoma.   Right eye

## 2023-08-15 DIAGNOSIS — E78.5 HYPERLIPIDEMIA, UNSPECIFIED HYPERLIPIDEMIA TYPE: ICD-10-CM

## 2023-08-15 DIAGNOSIS — Z01.818 PREOP EXAMINATION: ICD-10-CM

## 2023-08-15 DIAGNOSIS — E04.2 MULTINODULAR GOITER: ICD-10-CM

## 2023-08-15 LAB
ALBUMIN SERPL-MCNC: 4.2 G/DL (ref 3.4–5)
ALBUMIN/GLOB SERPL: 1.8 {RATIO} (ref 1.1–2.2)
ALP SERPL-CCNC: 63 U/L (ref 40–129)
ALT SERPL-CCNC: 13 U/L (ref 10–40)
ANION GAP SERPL CALCULATED.3IONS-SCNC: 5 MMOL/L (ref 3–16)
AST SERPL-CCNC: 17 U/L (ref 15–37)
BASOPHILS # BLD: 0.1 K/UL (ref 0–0.2)
BASOPHILS NFR BLD: 0.9 %
BILIRUB SERPL-MCNC: 1.4 MG/DL (ref 0–1)
BUN SERPL-MCNC: 14 MG/DL (ref 7–20)
CALCIUM SERPL-MCNC: 9.7 MG/DL (ref 8.3–10.6)
CHLORIDE SERPL-SCNC: 107 MMOL/L (ref 99–110)
CHOLEST SERPL-MCNC: 178 MG/DL (ref 0–199)
CO2 SERPL-SCNC: 30 MMOL/L (ref 21–32)
CREAT SERPL-MCNC: 0.7 MG/DL (ref 0.6–1.2)
DEPRECATED RDW RBC AUTO: 13.1 % (ref 12.4–15.4)
EOSINOPHIL # BLD: 0.2 K/UL (ref 0–0.6)
EOSINOPHIL NFR BLD: 2.9 %
GFR SERPLBLD CREATININE-BSD FMLA CKD-EPI: >60 ML/MIN/{1.73_M2}
GLUCOSE SERPL-MCNC: 87 MG/DL (ref 70–99)
HCT VFR BLD AUTO: 42.2 % (ref 36–48)
HDLC SERPL-MCNC: 64 MG/DL (ref 40–60)
HGB BLD-MCNC: 14.4 G/DL (ref 12–16)
LDLC SERPL CALC-MCNC: 101 MG/DL
LYMPHOCYTES # BLD: 1.9 K/UL (ref 1–5.1)
LYMPHOCYTES NFR BLD: 33.5 %
MCH RBC QN AUTO: 31.9 PG (ref 26–34)
MCHC RBC AUTO-ENTMCNC: 34.1 G/DL (ref 31–36)
MCV RBC AUTO: 93.5 FL (ref 80–100)
MONOCYTES # BLD: 0.4 K/UL (ref 0–1.3)
MONOCYTES NFR BLD: 6.2 %
NEUTROPHILS # BLD: 3.2 K/UL (ref 1.7–7.7)
NEUTROPHILS NFR BLD: 56.5 %
PLATELET # BLD AUTO: 202 K/UL (ref 135–450)
PMV BLD AUTO: 9.3 FL (ref 5–10.5)
POTASSIUM SERPL-SCNC: 4.5 MMOL/L (ref 3.5–5.1)
PROT SERPL-MCNC: 6.5 G/DL (ref 6.4–8.2)
RBC # BLD AUTO: 4.52 M/UL (ref 4–5.2)
SODIUM SERPL-SCNC: 142 MMOL/L (ref 136–145)
TRIGL SERPL-MCNC: 63 MG/DL (ref 0–150)
TSH SERPL DL<=0.005 MIU/L-ACNC: 1 UIU/ML (ref 0.27–4.2)
VLDLC SERPL CALC-MCNC: 13 MG/DL
WBC # BLD AUTO: 5.7 K/UL (ref 4–11)

## 2023-10-05 ENCOUNTER — OFFICE VISIT (OUTPATIENT)
Dept: DERMATOLOGY | Age: 73
End: 2023-10-05
Payer: MEDICARE

## 2023-10-05 DIAGNOSIS — L82.1 SK (SEBORRHEIC KERATOSIS): Primary | ICD-10-CM

## 2023-10-05 DIAGNOSIS — D22.9 MULTIPLE NEVI: ICD-10-CM

## 2023-10-05 DIAGNOSIS — Z85.828 HISTORY OF BASAL CELL CARCINOMA: ICD-10-CM

## 2023-10-05 PROCEDURE — 99213 OFFICE O/P EST LOW 20 MIN: CPT | Performed by: DERMATOLOGY

## 2023-10-05 PROCEDURE — 1123F ACP DISCUSS/DSCN MKR DOCD: CPT | Performed by: DERMATOLOGY

## 2023-10-05 NOTE — PROGRESS NOTES
that ends in \"sporins\" - high sensitivity per PT      No outpatient medications have been marked as taking for the 10/5/23 encounter (Office Visit) with Raymond Meeks MD.           Physical Examination       The following were examined and determined to be normal: Psych/Neuro, Scalp/hair, Head/face, Conjunctivae/eyelids, Gums/teeth/lips, Neck, Breast/axilla/chest, Abdomen, Back, RUE, LUE, RLE, LLE, and Nails/digits. The following were examined and determined to be abnormal: None. Well-appearing. 1.  Scattered on the scalp, lateral portions of the face, neck and trunk with multiple stuck on appearing verrucous brown papules and plaques. 2.  Trunk with several scattered well-defined round to oval smooth soft pink papules. Lower extremities with multiple small round smooth brown macules. 3.  Clear. Assessment and Plan     1. SK (seborrheic keratosis) - multiple    Reassurance. 2. Multiple nevi - benign appearing    Sun protective behaviors, including use of at least SPF 30 sunscreen, and self skin examinations were encouraged. Call for any new or concerning lesions. 3. History of basal cell carcinomas - clear    Sun protective behaviors, including use of at least SPF 30 sunscreen, and self skin examinations were encouraged. Call for any new or concerning lesions. Return in about 6 months (around 4/5/2024).     --Raymond Meeks MD

## 2024-01-21 SDOH — ECONOMIC STABILITY: TRANSPORTATION INSECURITY
IN THE PAST 12 MONTHS, HAS LACK OF TRANSPORTATION KEPT YOU FROM MEETINGS, WORK, OR FROM GETTING THINGS NEEDED FOR DAILY LIVING?: NO

## 2024-01-21 SDOH — HEALTH STABILITY: PHYSICAL HEALTH: ON AVERAGE, HOW MANY DAYS PER WEEK DO YOU ENGAGE IN MODERATE TO STRENUOUS EXERCISE (LIKE A BRISK WALK)?: 4 DAYS

## 2024-01-21 SDOH — ECONOMIC STABILITY: FOOD INSECURITY: WITHIN THE PAST 12 MONTHS, THE FOOD YOU BOUGHT JUST DIDN'T LAST AND YOU DIDN'T HAVE MONEY TO GET MORE.: NEVER TRUE

## 2024-01-21 SDOH — HEALTH STABILITY: PHYSICAL HEALTH: ON AVERAGE, HOW MANY MINUTES DO YOU ENGAGE IN EXERCISE AT THIS LEVEL?: 30 MIN

## 2024-01-21 SDOH — ECONOMIC STABILITY: FOOD INSECURITY: WITHIN THE PAST 12 MONTHS, YOU WORRIED THAT YOUR FOOD WOULD RUN OUT BEFORE YOU GOT MONEY TO BUY MORE.: NEVER TRUE

## 2024-01-21 SDOH — ECONOMIC STABILITY: INCOME INSECURITY: HOW HARD IS IT FOR YOU TO PAY FOR THE VERY BASICS LIKE FOOD, HOUSING, MEDICAL CARE, AND HEATING?: NOT VERY HARD

## 2024-01-21 SDOH — ECONOMIC STABILITY: HOUSING INSECURITY
IN THE LAST 12 MONTHS, WAS THERE A TIME WHEN YOU DID NOT HAVE A STEADY PLACE TO SLEEP OR SLEPT IN A SHELTER (INCLUDING NOW)?: NO

## 2024-01-21 ASSESSMENT — LIFESTYLE VARIABLES
HOW OFTEN DO YOU HAVE A DRINK CONTAINING ALCOHOL: MONTHLY OR LESS
HOW MANY STANDARD DRINKS CONTAINING ALCOHOL DO YOU HAVE ON A TYPICAL DAY: 1 OR 2
HOW OFTEN DO YOU HAVE A DRINK CONTAINING ALCOHOL: 2
HOW MANY STANDARD DRINKS CONTAINING ALCOHOL DO YOU HAVE ON A TYPICAL DAY: 1
HOW OFTEN DO YOU HAVE SIX OR MORE DRINKS ON ONE OCCASION: 1

## 2024-01-21 ASSESSMENT — PATIENT HEALTH QUESTIONNAIRE - PHQ9
2. FEELING DOWN, DEPRESSED OR HOPELESS: 0
SUM OF ALL RESPONSES TO PHQ QUESTIONS 1-9: 0
1. LITTLE INTEREST OR PLEASURE IN DOING THINGS: 0
SUM OF ALL RESPONSES TO PHQ QUESTIONS 1-9: 0
SUM OF ALL RESPONSES TO PHQ9 QUESTIONS 1 & 2: 0
SUM OF ALL RESPONSES TO PHQ QUESTIONS 1-9: 0
SUM OF ALL RESPONSES TO PHQ QUESTIONS 1-9: 0

## 2024-01-23 ENCOUNTER — HOSPITAL ENCOUNTER (OUTPATIENT)
Dept: WOMENS IMAGING | Age: 74
Discharge: HOME | End: 2024-01-23
Payer: MEDICARE

## 2024-01-23 VITALS — WEIGHT: 150 LBS | HEIGHT: 67 IN | BODY MASS INDEX: 23.54 KG/M2

## 2024-01-23 DIAGNOSIS — Z12.31 VISIT FOR SCREENING MAMMOGRAM: ICD-10-CM

## 2024-01-23 PROCEDURE — 77063 BREAST TOMOSYNTHESIS BI: CPT

## 2024-01-24 ENCOUNTER — OFFICE VISIT (OUTPATIENT)
Dept: FAMILY MEDICINE CLINIC | Age: 74
End: 2024-01-24

## 2024-01-24 VITALS
OXYGEN SATURATION: 98 % | DIASTOLIC BLOOD PRESSURE: 60 MMHG | WEIGHT: 149 LBS | BODY MASS INDEX: 23.39 KG/M2 | HEIGHT: 67 IN | SYSTOLIC BLOOD PRESSURE: 110 MMHG | HEART RATE: 79 BPM

## 2024-01-24 DIAGNOSIS — Z85.828 HISTORY OF BASAL CELL CARCINOMA: ICD-10-CM

## 2024-01-24 DIAGNOSIS — E04.2 MULTINODULAR GOITER: ICD-10-CM

## 2024-01-24 DIAGNOSIS — Z23 NEED FOR PNEUMOCOCCAL VACCINATION: ICD-10-CM

## 2024-01-24 DIAGNOSIS — Z00.00 MEDICARE ANNUAL WELLNESS VISIT, SUBSEQUENT: Primary | ICD-10-CM

## 2024-01-24 DIAGNOSIS — Z85.038 HISTORY OF COLON CANCER: ICD-10-CM

## 2024-01-24 PROBLEM — Z86.69 HISTORY OF NARROW ANGLE GLAUCOMA: Status: ACTIVE | Noted: 2017-01-09

## 2024-01-24 NOTE — PATIENT INSTRUCTIONS
INSTRUCTIONS  NEXT APPOINTMENT: Please schedule fasting annual physical (30 minutes) in one year.  OK to have water, black coffee and medications (except for diabetes medicines) with Dr. Chaparro or her NP, Sarah Galvez.   PLEASE TAKE THIS FORM TO CHECK-OUT WINDOW TO SCHEDULE NEXT VISIT.  Get annual COVID vaccine once a year. Can get at the same time as flu shot OR separate from other vaccines by at least 2 weeks.  Due for tetanus booster which prevents lockjaw from injuries.  Medicare only covers for injury, so call to be seen for tetanus booster if you have any cuts, punctures or other open skin injury.   Medicare part D patients:  Get Shingrix shingles vaccine at pharmacy (such as [facility] or [employer]). Need second dose in 2-6 months. Medicare starts covering it in 2023.  RSV (Respiratory syncytial virus) causes colds but can also cause viral pneumonia. Those at risk of more severe infection are the very young and those over 60 AND have chronic medical conditions such as heart, lung, kidney, liver, or neuromuscular disease; diabetes, immunocompromise, frail, or live in nursing home. It is a single dose that provides coverage for at least 2 years. Side effects are pain at injection site, fatigue, aches and headache. May be severe enough to interrupt daily activities in 1-3% for a day or 2. Uncertain about Medicare coverage at this time.   For allergies, patient may take OTC antihistamines such as Claritin/Allovert/loratidine or Zyrtec/certrizine 10 mg once a day.    Patient Education           Preventing Falls: Care Instructions  Injuries and health problems such as trouble walking or poor eyesight can increase your risk of falling. So can some medicines. But there are things you can do to help prevent falls. You can exercise to get stronger. You can also arrange your home to make it safer.    Talk to your doctor about the medicines you take. Ask if any of them increase the risk of falls and whether they can be

## 2024-01-24 NOTE — PROGRESS NOTES
TDaP, ADACEL (age 10y-64y), BOOSTRIX (age 10y+), IM, 0.5mL 02/17/2012, 02/17/2012    Zoster Live (Zostavax) 03/01/2013, 03/01/2013     LAST LABS  Cholesterol, Total   Date Value Ref Range Status   08/15/2023 178 0 - 199 mg/dL Final     LDL Calculated   Date Value Ref Range Status   08/15/2023 101 (H) <100 mg/dL Final     HDL   Date Value Ref Range Status   08/15/2023 64 (H) 40 - 60 mg/dL Final     Comment:     An HDL cholesterol less than 40 mg/dL is low and  constitutes a coronary heart disease risk factor.  An HDL cholesterol greater than 60 mg/dL is a  negative risk factor for coronary heart disease.     06/05/2012 62 (H) 40 - 60 mg/dl Final     Triglycerides   Date Value Ref Range Status   08/15/2023 63 0 - 150 mg/dL Final     Lab Results   Component Value Date     08/15/2023    K 4.5 08/15/2023    CREATININE 0.7 08/15/2023     Lab Results   Component Value Date    LABGLOM >60 08/15/2023    LABGLOM >60 10/28/2022    LABGLOM >60 08/17/2021    LABGLOM >60 08/12/2020    LABGLOM >60 03/25/2019     Lab Results   Component Value Date    WBC 5.7 08/15/2023    HGB 14.4 08/15/2023    HCT 42.2 08/15/2023    MCV 93.5 08/15/2023     08/15/2023     Lab Results   Component Value Date    ALT 13 08/15/2023    AST 17 08/15/2023    ALKPHOS 63 08/15/2023    BILITOT 1.4 (H) 08/15/2023     TSH (uIU/mL)   Date Value   08/15/2023 1.00     Lab Results   Component Value Date    GLUCOSE 87 08/15/2023   No results found for: \"LABA1C\"   CareTeam (Including outside providers/suppliers regularly involved in providing care):   Patient Care Team:  Shala Chaparro MD as PCP - General  Shala Chaparro MD as PCP - Empaneled Provider  Vasiliy Nelson MD as Consulting Physician (Endocrinology)    The following problems were reviewed today and where indicated follow up appointments were made and/or referrals ordered.    Positive Risk Factor Screenings with Interventions:             Safety:  Do you have non-slip mats or non-slip

## 2024-02-01 ENCOUNTER — TRANSCRIBE ORDERS (OUTPATIENT)
Dept: ADMINISTRATIVE | Age: 74
End: 2024-02-01

## 2024-02-01 DIAGNOSIS — M85.80 OSTEOPENIA, UNSPECIFIED LOCATION: Primary | ICD-10-CM

## 2024-02-01 DIAGNOSIS — N95.9 MENOPAUSAL PROBLEM: ICD-10-CM

## 2024-02-13 ENCOUNTER — HOSPITAL ENCOUNTER (OUTPATIENT)
Dept: WOMENS IMAGING | Age: 74
Discharge: HOME | End: 2024-02-13
Attending: OBSTETRICS & GYNECOLOGY
Payer: MEDICARE

## 2024-02-13 DIAGNOSIS — N95.9 MENOPAUSAL PROBLEM: ICD-10-CM

## 2024-02-13 DIAGNOSIS — M85.80 OSTEOPENIA, UNSPECIFIED LOCATION: ICD-10-CM

## 2024-02-13 PROCEDURE — 77080 DXA BONE DENSITY AXIAL: CPT

## 2024-04-11 ENCOUNTER — OFFICE VISIT (OUTPATIENT)
Dept: DERMATOLOGY | Age: 74
End: 2024-04-11

## 2024-04-11 DIAGNOSIS — D22.9 MULTIPLE NEVI: ICD-10-CM

## 2024-04-11 DIAGNOSIS — L82.1 SK (SEBORRHEIC KERATOSIS): ICD-10-CM

## 2024-04-11 DIAGNOSIS — D48.5 NEOPLASM OF UNCERTAIN BEHAVIOR OF SKIN: ICD-10-CM

## 2024-04-11 DIAGNOSIS — D18.01 CHERRY ANGIOMA: Primary | ICD-10-CM

## 2024-04-11 NOTE — PATIENT INSTRUCTIONS
Biopsy Wound Care Instructions    Keep the bandage in place for 24 hours.   Cleanse the wound with mild soapy water daily  Gently dry the area.  Apply Vaseline or petroleum jelly to the wound using a cotton tipped applicator.  Cover with a clean bandage.  Repeat this process until the biopsy site is healed.  If you had stitches placed, continue treating the site until the stitches are removed. Remember to make an appointment to return to have your stitches removed by our staff.  You may shower and bathe as usual.       ** Biopsy results generally take around 7 business days to come back.  If you have not heard from us by then, please call the office at [phone].    *Please note that biopsy results are released to both the patient and physician at the same time in [facility] [facility].  Please allow time for your physician to review the results.  One of our staff members will reach out to you with the results and plan.

## 2024-04-11 NOTE — PROGRESS NOTES
possible diagnosis; patient agreeable to proceed with biopsy (written consent obtained).  Risks of the procedure were reviewed including discomfort, bleeding, scar and infection.      The area(s) to be biopsied were marked with a surgical pen.  Alcohol was used to cleanse the site. Local anesthesia was acheived with 1% lidocaine with epinephrine. Shave biopsy was performed using a razor blade.  Hemostasis was achieved with electrodesiccation. The wound(s) were dressed with petrolatum and covered with a bandage.  Wound care instructions were reviewed.  1 Specimen (s) sent to pathology.  The specimen bottles were appropriately labeled.      The patient tolerated the procedure well and there were no immediate complications.          Return in about 6 months (around 10/11/2024).    --Brock Cruz MD

## 2024-04-15 DIAGNOSIS — C44.41 BASAL CELL CARCINOMA (BCC) OF SCALP: Primary | ICD-10-CM

## 2024-08-05 ENCOUNTER — PROCEDURE VISIT (OUTPATIENT)
Dept: SURGERY | Age: 74
End: 2024-08-05
Payer: MEDICARE

## 2024-08-05 VITALS — SYSTOLIC BLOOD PRESSURE: 114 MMHG | DIASTOLIC BLOOD PRESSURE: 76 MMHG | HEART RATE: 66 BPM

## 2024-08-05 DIAGNOSIS — C44.41 BASAL CELL CARCINOMA OF SCALP: Primary | ICD-10-CM

## 2024-08-05 PROCEDURE — 17311 MOHS 1 STAGE H/N/HF/G: CPT | Performed by: DERMATOLOGY

## 2024-08-05 PROCEDURE — 12032 INTMD RPR S/A/T/EXT 2.6-7.5: CPT | Performed by: DERMATOLOGY

## 2024-08-05 NOTE — PROGRESS NOTES
MOHS PROCEDURE NOTE    PHYSICIAN:  April Miller MD, Who operated in two distinct and integrated capacities as the surgeon removing the tissue and as the pathologist examining the tissue.    ASSISTANT: Jen Finn RN     REFERRING PROVIDER:   Brock Cruz MD    PREOPERATIVE DIAGNOSIS: Nodular Basal Cell Carcinoma     SPECIFIC MOHS INDICATIONS:  location and need for tissue conservation    AUC SCORIN/9    POSTOPERATIVE DIAGNOSIS: SAME    LOCATION: Left crown of scalp    OPERATIVE PROCEDURE:  MOHS MICROGRAPHIC SURGERY    RECONSTRUCTION OF DEFECT: Intermediate layered closure    PREOPERATIVE SIZE: 10x8 MM    DEFECT SIZE: 17x12 MM    LENGTH OF REPAIRED WOUND/SIZE OF FLAP/SIZE OF GRAFT:  40 MM    ANESTHESIA:  5mL 1% lidocaine with epinephrine 1:100,000 buffered.     EBL:  MINIMAL    DURATION OF PROCEDURE:  1 HOUR    POSTOPERATIVE OBSERVATION: 50 MINUTES    SPECIMENS:  SEE MOHS MAP    COMPLICATIONS:  NONE    DESCRIPTION OF PROCEDURE:  The patient was given a mirror, as appropriate, and the biopsy site was identified, marked with a surgical marking pen, and verified by the patient.   Options for treatment were discussed and the patient was informed that Mohs surgery was the selected treatment based on its lower recurrence rate, given the features listed above, as compared to other treatment modalities such as excision, radiation, or curettage, and agreed with this treatment plan.  Risks and benefits including bruising, swelling, bleeding, infection, nerve injury, recurrence, and scarring were discussed with the patient prior to the procedure and a written consent detailing these and other risks was reviewed with the patient and signed.    There was a time out for person and procedure verification.  The surgical site was prepped with an antiseptic solution.  Application of an antiseptic solution was repeated before each surgical stage.      Stage I:  The clinically-apparent tumor was carefully defined and

## 2024-08-05 NOTE — PATIENT INSTRUCTIONS
[facility] Office Hours:    Monday-Thursday  7:30 AM-4:30 PM    Friday  9:00 AM-1:00 PM       POST-OPERATIVE CARE FOR STITCHES  Bandage change after 48 hours    CARING FOR YOUR SURGICAL SITE  The bandage should remain on and completely dry for 48 hours. Do NOT get the bandage wet.  After the first 48 hours, gently remove the remaining part of the bandage. It can be helpful to moisten the bandage edges in the shower. Steri strips may still be on the wound. It is ok, they will fall off slowly with the daily bandage changes.  Gently clean the wound daily with mild soap and water. Try to clean off crust and debris.   Dry (pat) the area with a clean Q-tip or gauze.   Apply a layer of Vaseline/ Aquaphor (or Bacitracin if your doctor recommends) to the wound area only.  Cut a piece of Telfa (or any non-stick dressing) to fit just over the wound and secure it with paper tape. If the wound is small you may use a Band- Aid. Keep area covered for a total of 2-3 week(s).  If the dressing comes off or if you have questions, or concerns about the dressing, please call the office for instructions!    POST OPERATIVE INSTRUCTIONS    Activity: Do not lift anything heavier than a gallon of milk for 1 week. Also, avoid strenuous activity such as running, power walking or contact sports.  Eating and drinking: Do not drink alcohol for 48 hours after your procedure. Alcohol increases the chances of bleeding.  Medicines   -If you have discomfort, take Acetaminophen (Tylenol or Extra Strength Tylenol). Follow the instructions and warning on the bottle.  -If your doctor has prescribed you an Aspirin daily, please keep taking it. Do not take extra Aspirin or medicines containing Aspirin (such as [place] and Excedrin) for 48 hours after your procedure.    Bleeding: If bleeding occurs, DO NOT remove the bandage. Put firm pressure on the area with gauze for 20 minutes without peeking. If the bleeding continues, apply

## 2024-08-05 NOTE — PROGRESS NOTES
PRE-PROCEDURE SCREENING    Pacemaker/ICD: No  Difficulty with numbing in the past: No  Local Anesthesia Reaction/passing out: Yes, can get light headed (usually when it's on her face is when she gets lightheaded)  Latex or adhesive allergy:  Yes, sensitive to bandaids  Any history of reaction to suture or skin glue:  no  Bleeding/Clotting Disorders: No  Anticoagulant Therapy: No  Joint prosthesis: No  Artificial Heart Valve: No  Stroke or Seizures: No  Organ Transplant or Lymphoma: No  Immunosuppression: No  Respiratory Problems: No

## 2024-08-06 ENCOUNTER — TELEPHONE (OUTPATIENT)
Dept: SURGERY | Age: 74
End: 2024-08-06

## 2024-08-06 NOTE — TELEPHONE ENCOUNTER
The patient was in the office on 8/5/2024 for mohs located on the left crown of scalp with ILC repair.  The patient tolerated the procedure well and left the office in good condition.    Pain level on post-operative day 1:  Tylenol, sore, says it's a 2 of 10    Any bleeding episode that required pressure to be held, bandage change or a call to the office or MD?  no     Any other issues?:  no    A post-operative telephone call was placed at 10:15am in order to check on the patient's recovery process.  The patient reported doing well and had no complaints other than those listed above, if any.  All of the patient's questions were answered.

## 2024-08-19 ENCOUNTER — NURSE ONLY (OUTPATIENT)
Dept: SURGERY | Age: 74
End: 2024-08-19

## 2024-08-19 DIAGNOSIS — Z48.02 VISIT FOR SUTURE REMOVAL: Primary | ICD-10-CM

## 2024-08-19 NOTE — PROGRESS NOTES
S:  The patient is here for suture removal s/p Mohs surgery on the left crown of scalp and Intermediate layered closure repair, 2 week(s) ago. The site appears well-healed without signs of infection (redness, pain or discharge). The sutures were removed.  Wound care and activity instructions given.  The patient was scheduled for follow-up prn for scar/wound check.  The patient was scheduled for f/u with General Dermatology per their instructions.

## 2024-08-19 NOTE — PATIENT INSTRUCTIONS
[facility] Office Hours:    Monday-Thursday  7:30 AM-4:30 PM    Friday  9:00 AM-1:00 PM     WOUND CARE AFTER SUTURE REMOVAL    After your stiches have been removed, your scar is still very fragile. In fact, scars continue to change and evolve, what we call remodel, for about a year after your procedure.  Follow the following steps below to ensure that your scar heals well.    Instructions    1. If Steri-strips were applied, keep them on until they fall off on their own.  2. Protect your scar from the sun. Use a sunscreen or bandage to cover your scar. Sun exposure can cause your scar to become discolored and appear red or brown.  3. To help soften your scar more rapidly, it is helpful, but not necessary, for you to   massage the scar gently each night for twenty minutes.   4. “Spitting” suture. Occasionally, an inside suture (stitch) does not completely dissolve. When this happens, (generally 4-8 weeks after surgery), it causes a bump or “pimple” to form on the scar. This is easily removed and is not at all serious. It   does not mean the skin cancer has returned. Contact us if it happens, but do not be alarmed.      5. If you scar becomes tender, itchy or becomes very large, let Dr. Miller know.  There    are treatments that can improve the appearance of your scar or help make it more comfortable.

## 2024-10-15 ENCOUNTER — OFFICE VISIT (OUTPATIENT)
Dept: DERMATOLOGY | Age: 74
End: 2024-10-15
Payer: MEDICARE

## 2024-10-15 DIAGNOSIS — D22.9 MULTIPLE NEVI: Primary | ICD-10-CM

## 2024-10-15 DIAGNOSIS — Z85.828 HISTORY OF BASAL CELL CARCINOMA: ICD-10-CM

## 2024-10-15 DIAGNOSIS — L82.1 SK (SEBORRHEIC KERATOSIS): ICD-10-CM

## 2024-10-15 PROCEDURE — 99213 OFFICE O/P EST LOW 20 MIN: CPT | Performed by: DERMATOLOGY

## 2024-10-15 PROCEDURE — 1123F ACP DISCUSS/DSCN MKR DOCD: CPT | Performed by: DERMATOLOGY

## 2025-01-26 SDOH — ECONOMIC STABILITY: TRANSPORTATION INSECURITY
IN THE PAST 12 MONTHS, HAS THE LACK OF TRANSPORTATION KEPT YOU FROM MEDICAL APPOINTMENTS OR FROM GETTING MEDICATIONS?: NO

## 2025-01-26 SDOH — HEALTH STABILITY: PHYSICAL HEALTH: ON AVERAGE, HOW MANY MINUTES DO YOU ENGAGE IN EXERCISE AT THIS LEVEL?: 30 MIN

## 2025-01-26 SDOH — ECONOMIC STABILITY: INCOME INSECURITY: IN THE LAST 12 MONTHS, WAS THERE A TIME WHEN YOU WERE NOT ABLE TO PAY THE MORTGAGE OR RENT ON TIME?: NO

## 2025-01-26 SDOH — ECONOMIC STABILITY: FOOD INSECURITY: WITHIN THE PAST 12 MONTHS, THE FOOD YOU BOUGHT JUST DIDN'T LAST AND YOU DIDN'T HAVE MONEY TO GET MORE.: NEVER TRUE

## 2025-01-26 SDOH — ECONOMIC STABILITY: FOOD INSECURITY: WITHIN THE PAST 12 MONTHS, YOU WORRIED THAT YOUR FOOD WOULD RUN OUT BEFORE YOU GOT MONEY TO BUY MORE.: NEVER TRUE

## 2025-01-26 SDOH — HEALTH STABILITY: PHYSICAL HEALTH: ON AVERAGE, HOW MANY DAYS PER WEEK DO YOU ENGAGE IN MODERATE TO STRENUOUS EXERCISE (LIKE A BRISK WALK)?: 3 DAYS

## 2025-01-26 ASSESSMENT — PATIENT HEALTH QUESTIONNAIRE - PHQ9
1. LITTLE INTEREST OR PLEASURE IN DOING THINGS: NOT AT ALL
SUM OF ALL RESPONSES TO PHQ9 QUESTIONS 1 & 2: 0
2. FEELING DOWN, DEPRESSED OR HOPELESS: NOT AT ALL
SUM OF ALL RESPONSES TO PHQ QUESTIONS 1-9: 0

## 2025-01-26 ASSESSMENT — LIFESTYLE VARIABLES
HOW OFTEN DO YOU HAVE A DRINK CONTAINING ALCOHOL: 2-4 TIMES A MONTH
HOW MANY STANDARD DRINKS CONTAINING ALCOHOL DO YOU HAVE ON A TYPICAL DAY: 1 OR 2

## 2025-01-29 ENCOUNTER — HOSPITAL ENCOUNTER (OUTPATIENT)
Dept: WOMENS IMAGING | Age: 75
Discharge: HOME | End: 2025-01-29
Payer: MEDICARE

## 2025-01-29 VITALS — HEIGHT: 67 IN | WEIGHT: 145 LBS | BODY MASS INDEX: 22.76 KG/M2

## 2025-01-29 DIAGNOSIS — Z12.31 VISIT FOR SCREENING MAMMOGRAM: ICD-10-CM

## 2025-01-29 PROCEDURE — 77063 BREAST TOMOSYNTHESIS BI: CPT

## 2025-02-01 SDOH — ECONOMIC STABILITY: FOOD INSECURITY: WITHIN THE PAST 12 MONTHS, YOU WORRIED THAT YOUR FOOD WOULD RUN OUT BEFORE YOU GOT MONEY TO BUY MORE.: NEVER TRUE

## 2025-02-01 SDOH — ECONOMIC STABILITY: FOOD INSECURITY: WITHIN THE PAST 12 MONTHS, THE FOOD YOU BOUGHT JUST DIDN'T LAST AND YOU DIDN'T HAVE MONEY TO GET MORE.: NEVER TRUE

## 2025-02-01 SDOH — ECONOMIC STABILITY: INCOME INSECURITY: IN THE LAST 12 MONTHS, WAS THERE A TIME WHEN YOU WERE NOT ABLE TO PAY THE MORTGAGE OR RENT ON TIME?: NO

## 2025-02-01 SDOH — HEALTH STABILITY: PHYSICAL HEALTH: ON AVERAGE, HOW MANY MINUTES DO YOU ENGAGE IN EXERCISE AT THIS LEVEL?: 30 MIN

## 2025-02-01 SDOH — HEALTH STABILITY: PHYSICAL HEALTH: ON AVERAGE, HOW MANY DAYS PER WEEK DO YOU ENGAGE IN MODERATE TO STRENUOUS EXERCISE (LIKE A BRISK WALK)?: 3 DAYS

## 2025-02-01 ASSESSMENT — LIFESTYLE VARIABLES
HOW OFTEN DO YOU HAVE A DRINK CONTAINING ALCOHOL: 2-4 TIMES A MONTH
HOW MANY STANDARD DRINKS CONTAINING ALCOHOL DO YOU HAVE ON A TYPICAL DAY: 1
HOW OFTEN DO YOU HAVE A DRINK CONTAINING ALCOHOL: 3
HOW OFTEN DO YOU HAVE SIX OR MORE DRINKS ON ONE OCCASION: 1
HOW MANY STANDARD DRINKS CONTAINING ALCOHOL DO YOU HAVE ON A TYPICAL DAY: 1 OR 2

## 2025-02-01 ASSESSMENT — PATIENT HEALTH QUESTIONNAIRE - PHQ9
SUM OF ALL RESPONSES TO PHQ QUESTIONS 1-9: 0
2. FEELING DOWN, DEPRESSED OR HOPELESS: NOT AT ALL
SUM OF ALL RESPONSES TO PHQ9 QUESTIONS 1 & 2: 0
1. LITTLE INTEREST OR PLEASURE IN DOING THINGS: NOT AT ALL
SUM OF ALL RESPONSES TO PHQ QUESTIONS 1-9: 0

## 2025-02-04 ENCOUNTER — OFFICE VISIT (OUTPATIENT)
Dept: FAMILY MEDICINE CLINIC | Age: 75
End: 2025-02-04

## 2025-02-04 VITALS
DIASTOLIC BLOOD PRESSURE: 70 MMHG | OXYGEN SATURATION: 97 % | HEIGHT: 67 IN | BODY MASS INDEX: 23.86 KG/M2 | HEART RATE: 71 BPM | WEIGHT: 152 LBS | RESPIRATION RATE: 16 BRPM | SYSTOLIC BLOOD PRESSURE: 116 MMHG

## 2025-02-04 DIAGNOSIS — M17.0 PRIMARY OSTEOARTHRITIS OF BOTH KNEES: ICD-10-CM

## 2025-02-04 DIAGNOSIS — G47.00 INSOMNIA, UNSPECIFIED TYPE: ICD-10-CM

## 2025-02-04 DIAGNOSIS — Z85.038 HISTORY OF COLON CANCER: ICD-10-CM

## 2025-02-04 DIAGNOSIS — E04.2 MULTINODULAR GOITER: ICD-10-CM

## 2025-02-04 DIAGNOSIS — Z00.00 MEDICARE ANNUAL WELLNESS VISIT, SUBSEQUENT: ICD-10-CM

## 2025-02-04 DIAGNOSIS — Z85.828 HISTORY OF BASAL CELL CARCINOMA: ICD-10-CM

## 2025-02-04 DIAGNOSIS — R25.2 LEG CRAMPS: ICD-10-CM

## 2025-02-04 DIAGNOSIS — E78.5 HYPERLIPIDEMIA, UNSPECIFIED HYPERLIPIDEMIA TYPE: ICD-10-CM

## 2025-02-04 DIAGNOSIS — Z00.00 MEDICARE ANNUAL WELLNESS VISIT, SUBSEQUENT: Primary | ICD-10-CM

## 2025-02-04 DIAGNOSIS — M85.89 OSTEOPENIA OF MULTIPLE SITES: ICD-10-CM

## 2025-02-04 LAB
ALBUMIN SERPL-MCNC: 4.5 G/DL (ref 3.4–5)
ALBUMIN/GLOB SERPL: 2 {RATIO} (ref 1.1–2.2)
ALP SERPL-CCNC: 65 U/L (ref 40–129)
ALT SERPL-CCNC: 24 U/L (ref 10–40)
ANION GAP SERPL CALCULATED.3IONS-SCNC: 8 MMOL/L (ref 3–16)
AST SERPL-CCNC: 23 U/L (ref 15–37)
BILIRUB SERPL-MCNC: 1.5 MG/DL (ref 0–1)
BUN SERPL-MCNC: 12 MG/DL (ref 7–20)
CALCIUM SERPL-MCNC: 9.6 MG/DL (ref 8.3–10.6)
CHLORIDE SERPL-SCNC: 105 MMOL/L (ref 99–110)
CHOLEST SERPL-MCNC: 178 MG/DL (ref 0–199)
CO2 SERPL-SCNC: 28 MMOL/L (ref 21–32)
CREAT SERPL-MCNC: 0.8 MG/DL (ref 0.6–1.2)
GFR SERPLBLD CREATININE-BSD FMLA CKD-EPI: 78 ML/MIN/{1.73_M2}
GLUCOSE SERPL-MCNC: 86 MG/DL (ref 70–99)
HDLC SERPL-MCNC: 66 MG/DL (ref 40–60)
LDLC SERPL CALC-MCNC: 99 MG/DL
MAGNESIUM SERPL-MCNC: 2.33 MG/DL (ref 1.8–2.4)
POTASSIUM SERPL-SCNC: 4.5 MMOL/L (ref 3.5–5.1)
PROT SERPL-MCNC: 6.7 G/DL (ref 6.4–8.2)
SODIUM SERPL-SCNC: 141 MMOL/L (ref 136–145)
TRIGL SERPL-MCNC: 67 MG/DL (ref 0–150)
TSH SERPL DL<=0.005 MIU/L-ACNC: 0.84 UIU/ML (ref 0.27–4.2)
VLDLC SERPL CALC-MCNC: 13 MG/DL

## 2025-02-04 SDOH — ECONOMIC STABILITY: FOOD INSECURITY: WITHIN THE PAST 12 MONTHS, YOU WORRIED THAT YOUR FOOD WOULD RUN OUT BEFORE YOU GOT MONEY TO BUY MORE.: NEVER TRUE

## 2025-02-04 SDOH — ECONOMIC STABILITY: FOOD INSECURITY: WITHIN THE PAST 12 MONTHS, THE FOOD YOU BOUGHT JUST DIDN'T LAST AND YOU DIDN'T HAVE MONEY TO GET MORE.: NEVER TRUE

## 2025-02-04 NOTE — PROGRESS NOTES
osteopenia.  Taking daily calcium and vitamin D supplements.    OA bilateral knees: taking ibuprofen PRN- not every day.  Walking more with the nicer weather.  Pain is manageable.    History BCC: following semi-annually with derm for skin checks    History of colon cancer: having colonoscopy every 5 years- due this year.  Seeing Dr. Troncoso.  Taking fiber supplement.    She gets leg cramps- taking magnesium supplement which is helping.    Sleep: taking nightly melatonin 3 mg.  Getting about 6- 7 hours of sleep at night.    Patient's complete Health Risk Assessment and screening values have been reviewed and are found in Flowsheets. The following problems were reviewed today and where indicated follow up appointments were made and/or referrals ordered.    No Positive Risk Factors identified today.                                Objective   Vitals:    02/04/25 0812   BP: 116/70   Site: Right Upper Arm   Position: Sitting   Cuff Size: Medium Adult   Pulse: 71   Resp: 16   SpO2: 97%   Weight: 68.9 kg (152 lb)   Height: 1.702 m (5' 7\")      Body mass index is 23.81 kg/m².      General Appearance: alert and oriented to person, place and time, well-developed and well-nourished, in no acute distress  Skin: warm and dry, no rash or erythema  Head: normocephalic and atraumatic  Pulmonary/Chest: clear to auscultation bilaterally- no wheezes, rales or rhonchi, normal air movement, no respiratory distress  Cardiovascular: normal rate, regular rhythm, and no murmurs  Extremities: no edema  Musculoskeletal: normal range of motion, no joint swelling, deformity or tenderness  Neurologic: gait and coordination normal and speech normal            Allergies   Allergen Reactions    Erythromycin Shortness Of Breath    Adhesive Tape Rash     bandaids    Neosporin [Neomycin-Polymyxin-Gramicidin] Itching and Swelling     Any medication that ends in \"sporins\" - high sensitivity per PT      Prior to Visit Medications    Medication Sig

## 2025-04-14 ENCOUNTER — OFFICE VISIT (OUTPATIENT)
Age: 75
End: 2025-04-14
Payer: MEDICARE

## 2025-04-14 DIAGNOSIS — L82.1 SK (SEBORRHEIC KERATOSIS): Primary | ICD-10-CM

## 2025-04-14 DIAGNOSIS — D22.9 MULTIPLE NEVI: ICD-10-CM

## 2025-04-14 DIAGNOSIS — Z85.828 HISTORY OF BASAL CELL CARCINOMA: ICD-10-CM

## 2025-04-14 PROCEDURE — 1159F MED LIST DOCD IN RCRD: CPT | Performed by: DERMATOLOGY

## 2025-04-14 PROCEDURE — 1160F RVW MEDS BY RX/DR IN RCRD: CPT | Performed by: DERMATOLOGY

## 2025-04-14 PROCEDURE — 99213 OFFICE O/P EST LOW 20 MIN: CPT | Performed by: DERMATOLOGY

## 2025-04-14 PROCEDURE — 1123F ACP DISCUSS/DSCN MKR DOCD: CPT | Performed by: DERMATOLOGY

## 2025-04-14 NOTE — PROGRESS NOTES
[facility]  Brock Cruz MD  [phone]      Sujatha Dave  1950    74 y.o. female     Date of Visit: 4/14/2025    Chief Complaint: skin lesions of concern    History of Present Illness:    1.  She presents today for evaluation of multiple lesions on the scalp, face and trunk.  Some occasionally itch.    2.  She has multiple moles on the trunk and extremities-not aware of any concerning changes.    3.  She has a history of basal cell carcinomas, most recently on the left crown of the scalp-denies any signs of recurrence.    Nodular BCC of the left crown of the scalp-treated with Mohs by Dr. Miller on 8/5/2024.  Superficial BCC on the right shin-treated with curettage on 10/2/2020.  Nodular BCC of the right nasal ala-treated with Mohs by Dr. Miller on 10/3/2018.  BCC of the nasal tip - treated with Mohs by Jeaneth, repair by David in 2010.   BCC on the forehead - treated with EDC in 1999.     Dysplastic nevus on the right upper back 2007     Review of Systems:  Gen: Feels well, good sense of health.      Past Medical History, Family History, Surgical History, Medications and Allergies reviewed.    Past Medical History:   Diagnosis Date    Arthritis     Colon cancer (HCC)     Narrow angle glaucoma suspect of both eyes 1/9/2017    PONV (postoperative nausea and vomiting)     Skin cancer     Thyroid disease      Past Surgical History:   Procedure Laterality Date    BREAST BIOPSY Left 2018    benign    CATARACT REMOVAL WITH IMPLANT Bilateral 2023    COLECTOMY  2011    cancerous polyp    COLONOSCOPY  09/16/2015    COLONOSCOPY N/A 11/10/2020    COLONOSCOPY WITH BIOPSY performed by Ernesto Tronocso MD at [facility]    DILATION AND CURETTAGE OF UTERUS N/A 01/08/2019    benign- HYSTEROSCOPY DILATATION AND CURETTAGE performed by Maria Guadalupe Mireles MD at [facility] OR    MOHS SURGERY  10/03/2018    R ala BCC    MOHS SURGERY  08/05/2024    Left crown of scalp    SKIN CANCER

## (undated) DEVICE — Z DISCONTINUED USE 2270995 CATHETER URETH 16FR L16IN RED RUB INTMIT RND HLLW TIP 2 OPP

## (undated) DEVICE — PAD,NON-ADHERENT,3X8,STERILE,LF,1/PK: Brand: MEDLINE

## (undated) DEVICE — SOLUTION IV IRRIG POUR BRL 0.9% SODIUM CHL 2F7124

## (undated) DEVICE — SOLUTION PREP POVIDONE IOD FOR SKIN MUCOUS MEM PRIOR TO

## (undated) DEVICE — FORCEPS BX 240CM 2.4MM L NDL RAD JAW 4 M00513334

## (undated) DEVICE — PACK,LAPARASCOPY,PELVISCOPY,AURORA: Brand: MEDLINE

## (undated) DEVICE — MINOR SET UP PK

## (undated) DEVICE — SOLUTION IV 1000ML 0.9% SOD CHL PH 5 INJ USP VIAFLX PLAS

## (undated) DEVICE — Y-TYPE TUR/BLADDER IRRIGATION SET, REGULATING CLAMP

## (undated) DEVICE — DRAPE LAP 104X35X124IN BLU CTRL + FAB REINF ABD FEN

## (undated) DEVICE — DRAPE,UNDERBUTTOCKS,PCH,STERILE: Brand: MEDLINE

## (undated) DEVICE — Z DISCONTINUED BY MEDLINE USE 2711682 TRAY SKIN PREP DRY W/ PREM GLV

## (undated) DEVICE — COVER LT HNDL BLU PLAS

## (undated) DEVICE — SOLUTION SCRB 4OZ 10% POVIDONE IOD ANTIMIC BTL

## (undated) DEVICE — KIT OR ROOM TURNOVER W/STRAP